# Patient Record
Sex: FEMALE | Race: WHITE | NOT HISPANIC OR LATINO | Employment: OTHER | URBAN - NONMETROPOLITAN AREA
[De-identification: names, ages, dates, MRNs, and addresses within clinical notes are randomized per-mention and may not be internally consistent; named-entity substitution may affect disease eponyms.]

---

## 2019-07-16 ENCOUNTER — HOSPITAL ENCOUNTER (EMERGENCY)
Facility: HOSPITAL | Age: 69
End: 2019-07-16

## 2020-03-02 ENCOUNTER — OFFICE VISIT (OUTPATIENT)
Dept: INTERNAL MEDICINE | Facility: CLINIC | Age: 70
End: 2020-03-02

## 2020-03-02 VITALS
HEIGHT: 63 IN | SYSTOLIC BLOOD PRESSURE: 150 MMHG | DIASTOLIC BLOOD PRESSURE: 94 MMHG | OXYGEN SATURATION: 97 % | WEIGHT: 220.13 LBS | RESPIRATION RATE: 18 BRPM | HEART RATE: 77 BPM | BODY MASS INDEX: 39 KG/M2

## 2020-03-02 DIAGNOSIS — Z00.00 ANNUAL PHYSICAL EXAM: Primary | ICD-10-CM

## 2020-03-02 DIAGNOSIS — E66.01 MORBIDLY OBESE (HCC): ICD-10-CM

## 2020-03-02 DIAGNOSIS — E78.49 OTHER HYPERLIPIDEMIA: ICD-10-CM

## 2020-03-02 DIAGNOSIS — I10 ESSENTIAL HYPERTENSION: ICD-10-CM

## 2020-03-02 DIAGNOSIS — Z23 NEED FOR VACCINATION AGAINST STREPTOCOCCUS PNEUMONIAE USING PNEUMOCOCCAL CONJUGATE VACCINE 13: ICD-10-CM

## 2020-03-02 PROBLEM — E66.9 OBESITY: Status: ACTIVE | Noted: 2018-07-23

## 2020-03-02 PROBLEM — Z91.81 AT RISK FOR FALLS: Status: ACTIVE | Noted: 2018-07-23

## 2020-03-02 PROCEDURE — 99214 OFFICE O/P EST MOD 30 MIN: CPT | Performed by: NURSE PRACTITIONER

## 2020-03-02 PROCEDURE — G0009 ADMIN PNEUMOCOCCAL VACCINE: HCPCS | Performed by: NURSE PRACTITIONER

## 2020-03-02 PROCEDURE — 90670 PCV13 VACCINE IM: CPT | Performed by: NURSE PRACTITIONER

## 2020-03-02 RX ORDER — ATORVASTATIN CALCIUM 20 MG/1
20 TABLET, FILM COATED ORAL DAILY
Qty: 90 TABLET | Refills: 3 | Status: SHIPPED | OUTPATIENT
Start: 2020-03-02 | End: 2020-03-16 | Stop reason: SDUPTHER

## 2020-03-02 RX ORDER — LISINOPRIL 20 MG/1
1 TABLET ORAL EVERY 12 HOURS SCHEDULED
COMMUNITY
End: 2020-03-02 | Stop reason: SDUPTHER

## 2020-03-02 RX ORDER — HYDROCHLOROTHIAZIDE 25 MG/1
25 TABLET ORAL DAILY
Qty: 90 TABLET | Refills: 3 | Status: SHIPPED | OUTPATIENT
Start: 2020-03-02 | End: 2020-03-16 | Stop reason: SDUPTHER

## 2020-03-02 RX ORDER — HYDROCHLOROTHIAZIDE 25 MG/1
1 TABLET ORAL DAILY
COMMUNITY
Start: 2019-12-06 | End: 2020-03-02 | Stop reason: SDUPTHER

## 2020-03-02 RX ORDER — ATORVASTATIN CALCIUM 20 MG/1
1 TABLET, FILM COATED ORAL DAILY
COMMUNITY
Start: 2020-01-18 | End: 2020-03-02 | Stop reason: SDUPTHER

## 2020-03-02 RX ORDER — LISINOPRIL 20 MG/1
20 TABLET ORAL DAILY
Qty: 90 TABLET | Refills: 3 | Status: SHIPPED | OUTPATIENT
Start: 2020-03-02 | End: 2020-03-16 | Stop reason: SDUPTHER

## 2020-03-02 NOTE — PROGRESS NOTES
Subjective   Serenity Hung is a 69 y.o. female.   Chief Complaint   Patient presents with   • Annual Exam     Yearly exam.  Needs new lab orders.       Serenity presents today for annual physical.  She denies complaints or concerns to discuss today.  She is due for labs and is fasting.  She reports already having Hep C screening with her old physician in [place] which yielded negative results.  She is currently scheduled for Dexa scan and a Mammogram.  She reports her Shingrix and Tdap vaccines are already done, though she is wanting to get the Prevnar vaccine today.         The following portions of the patient's history were reviewed and updated as appropriate: allergies, current medications, past family history, past medical history, past social history, past surgical history and problem list.    Review of Systems   Constitutional: Negative for activity change, appetite change, fatigue, fever, unexpected weight gain and unexpected weight loss.   HENT: Negative for swollen glands, trouble swallowing and voice change.    Eyes: Negative for blurred vision and visual disturbance.   Respiratory: Negative for cough and shortness of breath.    Cardiovascular: Negative for chest pain, palpitations and leg swelling.   Gastrointestinal: Negative for abdominal pain, constipation, diarrhea, nausea, vomiting and indigestion.   Endocrine: Negative for cold intolerance, heat intolerance, polydipsia and polyphagia.   Genitourinary: Negative for dysuria and frequency.   Musculoskeletal: Negative for arthralgias, back pain, joint swelling and neck pain.   Skin: Negative for color change, rash and skin lesions.   Neurological: Negative for dizziness, weakness, headache, memory problem and confusion.   Hematological: Does not bruise/bleed easily.   Psychiatric/Behavioral: Negative for agitation, hallucinations and suicidal ideas. The patient is not nervous/anxious.        Objective   Past Medical History:   Diagnosis Date   •  Hyperlipidemia    • Hypertension       History reviewed. No pertinent surgical history.     Current Outpatient Medications:   •  atorvastatin (LIPITOR) 20 MG tablet, Take 1 tablet by mouth Daily., Disp: 90 tablet, Rfl: 3  •  hydroCHLOROthiazide (HYDRODIURIL) 25 MG tablet, Take 1 tablet by mouth Daily., Disp: 90 tablet, Rfl: 3  •  lisinopril (PRINIVIL,ZESTRIL) 20 MG tablet, Take 1 tablet by mouth Daily., Disp: 90 tablet, Rfl: 3     Vitals:    03/02/20 0843   BP: 150/94   Pulse: 77   Resp: 18   SpO2: 97%         03/02/20  0843   Weight: 99.8 kg (220 lb 2 oz)     Patient's Body mass index is 38.99 kg/m². BMI is above normal parameters. Recommendations include: nutrition counseling.      Physical Exam   Constitutional: She is oriented to person, place, and time. She appears well-developed and well-nourished.   HENT:   Head: Normocephalic and atraumatic.   Right Ear: External ear normal.   Left Ear: External ear normal.   Nose: Nose normal.   Mouth/Throat: Oropharynx is clear and moist.   Eyes: Conjunctivae and EOM are normal.   Neck: Normal range of motion. Neck supple. No thyromegaly present.   Cardiovascular: Normal rate, regular rhythm, normal heart sounds and intact distal pulses.   Pulmonary/Chest: Effort normal and breath sounds normal.   Abdominal: Soft. Bowel sounds are normal.   Lymphadenopathy:     She has no cervical adenopathy.   Neurological: She is alert and oriented to person, place, and time.   Skin: Skin is warm and dry.   Several seborrheic keratosis lesions to back   Psychiatric: She has a normal mood and affect. Her behavior is normal. Thought content normal.   Nursing note and vitals reviewed.        Assessment/Plan   Diagnoses and all orders for this visit:    1. Annual physical exam (Primary)  -     Comprehensive Metabolic Panel  -     CBC & Differential  -     Lipid Panel  -     Uric Acid  -     TSH  -     Urinalysis With Microscopic - Urine, Clean Catch    2. Essential hypertension  -      hydroCHLOROthiazide (HYDRODIURIL) 25 MG tablet; Take 1 tablet by mouth Daily.  Dispense: 90 tablet; Refill: 3  -     lisinopril (PRINIVIL,ZESTRIL) 20 MG tablet; Take 1 tablet by mouth Daily.  Dispense: 90 tablet; Refill: 3    3. Other hyperlipidemia  -     atorvastatin (LIPITOR) 20 MG tablet; Take 1 tablet by mouth Daily.  Dispense: 90 tablet; Refill: 3    4. Morbidly obese (CMS/HCC)    5. Need for vaccination against Streptococcus pneumoniae using pneumococcal conjugate vaccine 13  -     Pneumococcal Conjugate Vaccine 13-Valent All (PCV13)    She refuses manual breast exam today but reports she checks herself monthly at home.   BP is elevated in office today.  Patient has not been monitor pressures at home.  Recheck by provider was 150/92, which is consistent to what MA got.  Advised to monitor her pressures at home over the next 2 weeks and log them.  Please return in 2 weeks with log to discuss.  Will adjust medication at that time if needed.   Prevnar vaccine today in office.

## 2020-03-03 LAB
ALBUMIN SERPL-MCNC: 4.3 G/DL (ref 3.5–5.2)
ALBUMIN/GLOB SERPL: 1.6 G/DL
ALP SERPL-CCNC: 65 U/L (ref 39–117)
ALT SERPL-CCNC: 14 U/L (ref 1–33)
APPEARANCE UR: CLEAR
AST SERPL-CCNC: 13 U/L (ref 1–32)
BACTERIA #/AREA URNS HPF: NORMAL /HPF
BASOPHILS # BLD AUTO: 0.05 10*3/MM3 (ref 0–0.2)
BASOPHILS NFR BLD AUTO: 0.9 % (ref 0–1.5)
BILIRUB SERPL-MCNC: 1.4 MG/DL (ref 0.2–1.2)
BILIRUB UR QL STRIP: NEGATIVE
BUN SERPL-MCNC: 10 MG/DL (ref 8–23)
BUN/CREAT SERPL: 13.7 (ref 7–25)
CALCIUM SERPL-MCNC: 9.1 MG/DL (ref 8.6–10.5)
CASTS URNS MICRO: NORMAL
CHLORIDE SERPL-SCNC: 93 MMOL/L (ref 98–107)
CHOLEST SERPL-MCNC: 113 MG/DL (ref 0–200)
CO2 SERPL-SCNC: 26.8 MMOL/L (ref 22–29)
COLOR UR: YELLOW
CREAT SERPL-MCNC: 0.73 MG/DL (ref 0.57–1)
EOSINOPHIL # BLD AUTO: 0.08 10*3/MM3 (ref 0–0.4)
EOSINOPHIL NFR BLD AUTO: 1.4 % (ref 0.3–6.2)
EPI CELLS #/AREA URNS HPF: NORMAL /HPF
ERYTHROCYTE [DISTWIDTH] IN BLOOD BY AUTOMATED COUNT: 13.9 % (ref 12.3–15.4)
GLOBULIN SER CALC-MCNC: 2.7 GM/DL
GLUCOSE SERPL-MCNC: 98 MG/DL (ref 65–99)
GLUCOSE UR QL: NEGATIVE
HCT VFR BLD AUTO: 37.8 % (ref 34–46.6)
HDLC SERPL-MCNC: 48 MG/DL (ref 40–60)
HGB BLD-MCNC: 12.3 G/DL (ref 12–15.9)
HGB UR QL STRIP: NEGATIVE
IMM GRANULOCYTES # BLD AUTO: 0.02 10*3/MM3 (ref 0–0.05)
IMM GRANULOCYTES NFR BLD AUTO: 0.4 % (ref 0–0.5)
KETONES UR QL STRIP: NEGATIVE
LDLC SERPL CALC-MCNC: 42 MG/DL (ref 0–100)
LEUKOCYTE ESTERASE UR QL STRIP: ABNORMAL
LYMPHOCYTES # BLD AUTO: 2.04 10*3/MM3 (ref 0.7–3.1)
LYMPHOCYTES NFR BLD AUTO: 36 % (ref 19.6–45.3)
MCH RBC QN AUTO: 25.3 PG (ref 26.6–33)
MCHC RBC AUTO-ENTMCNC: 32.5 G/DL (ref 31.5–35.7)
MCV RBC AUTO: 77.6 FL (ref 79–97)
MONOCYTES # BLD AUTO: 0.32 10*3/MM3 (ref 0.1–0.9)
MONOCYTES NFR BLD AUTO: 5.6 % (ref 5–12)
NEUTROPHILS # BLD AUTO: 3.16 10*3/MM3 (ref 1.7–7)
NEUTROPHILS NFR BLD AUTO: 55.7 % (ref 42.7–76)
NITRITE UR QL STRIP: NEGATIVE
NRBC BLD AUTO-RTO: 0 /100 WBC (ref 0–0.2)
PH UR STRIP: 6 [PH] (ref 5–8)
PLATELET # BLD AUTO: 324 10*3/MM3 (ref 140–450)
POTASSIUM SERPL-SCNC: 4.6 MMOL/L (ref 3.5–5.2)
PROT SERPL-MCNC: 7 G/DL (ref 6–8.5)
PROT UR QL STRIP: NEGATIVE
RBC # BLD AUTO: 4.87 10*6/MM3 (ref 3.77–5.28)
RBC #/AREA URNS HPF: NORMAL /HPF
SODIUM SERPL-SCNC: 132 MMOL/L (ref 136–145)
SP GR UR: 1.01 (ref 1–1.03)
TRIGL SERPL-MCNC: 114 MG/DL (ref 0–150)
TSH SERPL DL<=0.005 MIU/L-ACNC: 1.51 UIU/ML (ref 0.27–4.2)
URATE SERPL-MCNC: 5.5 MG/DL (ref 2.4–5.7)
UROBILINOGEN UR STRIP-MCNC: ABNORMAL MG/DL
VLDLC SERPL CALC-MCNC: 22.8 MG/DL
WBC # BLD AUTO: 5.67 10*3/MM3 (ref 3.4–10.8)
WBC #/AREA URNS HPF: NORMAL /HPF

## 2020-03-03 NOTE — PROGRESS NOTES
Labs look ok.  Sodium level is a little lower than the last time it was checked.  This could be related to the diuretic use.  She can add a small amount of salt to her food but needs to be careful not to overdue it as it will raise her BP.    Urinalysis shows trace leukocytes which is likely contamination.  Please check and see if she is having any urinary symptoms.

## 2020-03-16 ENCOUNTER — OFFICE VISIT (OUTPATIENT)
Dept: INTERNAL MEDICINE | Facility: CLINIC | Age: 70
End: 2020-03-16

## 2020-03-16 VITALS
RESPIRATION RATE: 18 BRPM | BODY MASS INDEX: 39.16 KG/M2 | DIASTOLIC BLOOD PRESSURE: 90 MMHG | WEIGHT: 221 LBS | OXYGEN SATURATION: 98 % | TEMPERATURE: 98.6 F | HEART RATE: 76 BPM | HEIGHT: 63 IN | SYSTOLIC BLOOD PRESSURE: 152 MMHG

## 2020-03-16 DIAGNOSIS — I10 ESSENTIAL HYPERTENSION: Primary | ICD-10-CM

## 2020-03-16 PROBLEM — M81.0 OSTEOPOROSIS, POSTMENOPAUSAL: Status: ACTIVE | Noted: 2020-03-16

## 2020-03-16 PROCEDURE — 99213 OFFICE O/P EST LOW 20 MIN: CPT | Performed by: NURSE PRACTITIONER

## 2020-03-16 RX ORDER — HYDROCHLOROTHIAZIDE 25 MG/1
25 TABLET ORAL DAILY
COMMUNITY
Start: 2019-08-26 | End: 2020-08-25 | Stop reason: SDUPTHER

## 2020-03-16 RX ORDER — AMLODIPINE BESYLATE 5 MG/1
2.5 TABLET ORAL DAILY
Qty: 15 TABLET | Refills: 5 | Status: SHIPPED | OUTPATIENT
Start: 2020-03-16 | End: 2020-04-14 | Stop reason: SDUPTHER

## 2020-03-16 RX ORDER — LISINOPRIL 20 MG/1
20 TABLET ORAL DAILY
COMMUNITY
Start: 2019-08-26 | End: 2020-12-30

## 2020-03-16 RX ORDER — ATORVASTATIN CALCIUM 20 MG/1
20 TABLET, FILM COATED ORAL DAILY
COMMUNITY
Start: 2019-08-26 | End: 2020-12-30

## 2020-03-16 NOTE — PATIENT INSTRUCTIONS
Monitor Blood pressure.   Bring blood pressure cuff with you to next appointment or to the pharmacy to have the accuracy checked.

## 2020-03-16 NOTE — PROGRESS NOTES
Subjective   Serenity Hung is a 69 y.o. female.   Chief Complaint   Patient presents with   • Follow-up     hypertension       Abhilash presents for a 2 week follow up on Hypertension.  She was elevated at that past visit and was instructed to monitor her pressures at home and would re-evaluate today.  She does not have a reading list with her today and states she doesn't trust her cuff at home.  She reports she checked her pressure this morning at it was about 130s/80s.  Again, she states she doesn't trust what her cuff tells her as it is always lower than what it obtained in the office.         The following portions of the patient's history were reviewed and updated as appropriate: allergies, current medications, past family history, past medical history, past social history, past surgical history and problem list.    Review of Systems   Constitutional: Negative for activity change, appetite change, fatigue, fever, unexpected weight gain and unexpected weight loss.   HENT: Negative for swollen glands, trouble swallowing and voice change.    Eyes: Negative for blurred vision and visual disturbance.   Respiratory: Negative for cough and shortness of breath.    Cardiovascular: Negative for chest pain, palpitations and leg swelling.   Gastrointestinal: Negative for abdominal pain, constipation, diarrhea, nausea, vomiting and indigestion.   Endocrine: Negative for cold intolerance, heat intolerance, polydipsia and polyphagia.   Genitourinary: Negative for dysuria and frequency.   Musculoskeletal: Negative for arthralgias, back pain, joint swelling and neck pain.   Skin: Negative for color change, rash and skin lesions.   Neurological: Negative for dizziness, weakness, headache, memory problem and confusion.   Hematological: Does not bruise/bleed easily.   Psychiatric/Behavioral: Negative for agitation, hallucinations and suicidal ideas. The patient is not nervous/anxious.        Objective   Past Medical History:   Diagnosis  Date   • Hyperlipidemia    • Hypertension       History reviewed. No pertinent surgical history.     Current Outpatient Medications:   •  atorvastatin (LIPITOR) 20 MG tablet, Take 20 mg by mouth Daily., Disp: , Rfl:   •  hydroCHLOROthiazide (HYDRODIURIL) 25 MG tablet, Take 25 mg by mouth Daily., Disp: , Rfl:   •  lisinopril (PRINIVIL,ZESTRIL) 20 MG tablet, Take 20 mg by mouth Daily., Disp: , Rfl:   •  amLODIPine (NORVASC) 5 MG tablet, Take 0.5 tablets by mouth Daily., Disp: 15 tablet, Rfl: 5     Vitals:    03/16/20 0906   BP: 152/90   Pulse: 76   Resp: 18   Temp: 98.6 °F (37 °C)   SpO2: 98%         03/16/20 0906   Weight: 100 kg (221 lb)         Physical Exam   Constitutional: She is oriented to person, place, and time. She appears well-developed and well-nourished.   HENT:   Head: Normocephalic and atraumatic.   Right Ear: External ear normal.   Left Ear: External ear normal.   Nose: Nose normal.   Eyes: Conjunctivae and EOM are normal.   Cardiovascular: Normal rate, regular rhythm and normal heart sounds.   1+ bilateral lower extremity edema   Pulmonary/Chest: Effort normal and breath sounds normal.   Neurological: She is alert and oriented to person, place, and time.   Skin: Skin is warm and dry.   Psychiatric: She has a normal mood and affect. Her behavior is normal. Thought content normal.   Nursing note and vitals reviewed.          Assessment/Plan   Diagnoses and all orders for this visit:    1. Essential hypertension (Primary)  -     amLODIPine (NORVASC) 5 MG tablet; Take 0.5 tablets by mouth Daily.  Dispense: 15 tablet; Refill: 5      BP elevated in office again today.  Recheck on BP by this provider was 148/82, after patient had been sitting minimum of 15 minutes.  Will start low dose of amlodipine today.  I have encouraged patient to bring cuff to pharmacy or clinic to have the accuracy checked.  Since there is some question about how patient really runs at home, will start lower dose and see how she  tolerates.  She will follow up in 4 weeks to reassess.

## 2020-04-08 ENCOUNTER — TELEPHONE (OUTPATIENT)
Dept: INTERNAL MEDICINE | Facility: CLINIC | Age: 70
End: 2020-04-08

## 2020-04-08 NOTE — TELEPHONE ENCOUNTER
Called patient to discuss changing appointment to a telephone visit.  She states she is doing just fine.  She reports readings are 122/67, 126/73, 137/72.  She denies side effects of swelling related to amlodipine.  She denies chest pain, palpitations, or shortness of breath.  Will cancel appointment at this time as patient does not have any concerns and pressures are doing well.  She plans to come to the office to have BP checked manually when pandemic has settled.

## 2020-04-14 DIAGNOSIS — I10 ESSENTIAL HYPERTENSION: ICD-10-CM

## 2020-04-14 RX ORDER — AMLODIPINE BESYLATE 5 MG/1
2.5 TABLET ORAL DAILY
Qty: 45 TABLET | Refills: 3 | Status: SHIPPED | OUTPATIENT
Start: 2020-04-14 | End: 2020-06-12 | Stop reason: SDUPTHER

## 2020-06-12 DIAGNOSIS — I10 ESSENTIAL HYPERTENSION: ICD-10-CM

## 2020-06-12 RX ORDER — AMLODIPINE BESYLATE 5 MG/1
2.5 TABLET ORAL DAILY
Qty: 45 TABLET | Refills: 3 | Status: SHIPPED | OUTPATIENT
Start: 2020-06-12 | End: 2021-02-23 | Stop reason: SDUPTHER

## 2020-06-15 ENCOUNTER — OFFICE VISIT (OUTPATIENT)
Dept: INTERNAL MEDICINE | Facility: CLINIC | Age: 70
End: 2020-06-15

## 2020-06-15 VITALS
HEART RATE: 73 BPM | HEIGHT: 63 IN | OXYGEN SATURATION: 99 % | BODY MASS INDEX: 38.06 KG/M2 | TEMPERATURE: 98.4 F | DIASTOLIC BLOOD PRESSURE: 86 MMHG | SYSTOLIC BLOOD PRESSURE: 120 MMHG | WEIGHT: 214.8 LBS

## 2020-06-15 DIAGNOSIS — E78.49 OTHER HYPERLIPIDEMIA: ICD-10-CM

## 2020-06-15 DIAGNOSIS — I10 ESSENTIAL HYPERTENSION: Primary | ICD-10-CM

## 2020-06-15 PROCEDURE — 99213 OFFICE O/P EST LOW 20 MIN: CPT | Performed by: NURSE PRACTITIONER

## 2020-06-15 NOTE — PROGRESS NOTES
Subjective   Serenity Hung is a 69 y.o. female.   Chief Complaint   Patient presents with   • Hypertension     follow-up   • Medication Problem     discuss lisinopril dosage       Serenity presents today for 3 month follow up on Hypertension.  At her previous visit she was started on Amlodipine which she has been tolerating well.  Since that visit she has reported via telephone 120-130/67-70s readings.  Today she is 120/86.  She denies any chest pain, shortness of breath, palpitations, or worsening leg edema.  She states she is confused on her Lisinopril stating she used to take it twice a day but when she got her new bottle it says once daily.  She believes she has 10mg tablets, not 20mg.         The following portions of the patient's history were reviewed and updated as appropriate: allergies, current medications, past family history, past medical history, past social history, past surgical history and problem list.    Review of Systems   Constitutional: Negative for activity change, appetite change, fatigue, fever, unexpected weight gain and unexpected weight loss.   HENT: Negative for swollen glands, trouble swallowing and voice change.    Eyes: Negative for blurred vision and visual disturbance.   Respiratory: Negative for cough and shortness of breath.    Cardiovascular: Negative for chest pain, palpitations and leg swelling.   Gastrointestinal: Negative for abdominal pain, constipation, diarrhea, nausea, vomiting and indigestion.   Endocrine: Negative for cold intolerance, heat intolerance, polydipsia and polyphagia.   Genitourinary: Negative for dysuria and frequency.   Musculoskeletal: Negative for arthralgias, back pain, joint swelling and neck pain.   Skin: Negative for color change, rash and skin lesions.   Neurological: Negative for dizziness, weakness, headache, memory problem and confusion.   Hematological: Does not bruise/bleed easily.   Psychiatric/Behavioral: Negative for agitation, hallucinations and  suicidal ideas. The patient is not nervous/anxious.        Objective   Past Medical History:   Diagnosis Date   • Hyperlipidemia    • Hypertension       History reviewed. No pertinent surgical history.     Current Outpatient Medications:   •  amLODIPine (NORVASC) 5 MG tablet, Take 0.5 tablets by mouth Daily., Disp: 45 tablet, Rfl: 3  •  atorvastatin (LIPITOR) 20 MG tablet, Take 20 mg by mouth Daily., Disp: , Rfl:   •  hydroCHLOROthiazide (HYDRODIURIL) 25 MG tablet, Take 25 mg by mouth Daily., Disp: , Rfl:   •  lisinopril (PRINIVIL,ZESTRIL) 20 MG tablet, Take 20 mg by mouth Daily., Disp: , Rfl:      Vitals:    06/15/20 0940   BP: 120/86   Pulse: 73   Temp: 98.4 °F (36.9 °C)   SpO2: 99%         06/15/20  0940   Weight: 97.4 kg (214 lb 12.8 oz)     Patient's Body mass index is 38.05 kg/m². BMI is above normal parameters. Recommendations include: nutrition counseling.      Physical Exam   Constitutional: She is oriented to person, place, and time. She appears well-developed and well-nourished.   HENT:   Head: Normocephalic and atraumatic.   Right Ear: External ear normal.   Left Ear: External ear normal.   Nose: Nose normal.   Mouth/Throat: Oropharynx is clear and moist.   Eyes: Conjunctivae and EOM are normal.   Neck: Normal range of motion. Neck supple.   Cardiovascular: Normal rate, regular rhythm, normal heart sounds and intact distal pulses.   Pulses:       Radial pulses are 2+ on the right side, and 2+ on the left side.        Dorsalis pedis pulses are 2+ on the right side, and 2+ on the left side.   No bilateral lower extremity edema    Pulmonary/Chest: Effort normal and breath sounds normal.   Lymphadenopathy:     She has no cervical adenopathy.   Neurological: She is alert and oriented to person, place, and time.   Skin: Skin is warm and dry.   Psychiatric: She has a normal mood and affect. Her speech is normal and behavior is normal. Thought content normal.   Nursing note and vitals  reviewed.            Assessment/Plan   Diagnoses and all orders for this visit:    1. Essential hypertension (Primary)    2. Other hyperlipidemia      Blood pressure is well managed at this time.  She presents with readings in office that shoe 120-130s/70-80s.  Due to confusion of Lisinopril dosing, I have requested she check her bottle when she gets home and reports exactly what she has and how she is taking it.  I will update the chart when I receive the information.  I have asked that she not make any changes from what she is doing as her BP is well controlled and she is feeling good.    Will follow up in 3 months for her 6 month follow up.  We will complete her Medicare wellness at that time as well.  Continue to work on diet and watch sodium intake.    She is scheduled for her Mammogram next month and declines DEXA scan this year.

## 2020-07-13 ENCOUNTER — TELEPHONE (OUTPATIENT)
Dept: INTERNAL MEDICINE | Facility: CLINIC | Age: 70
End: 2020-07-13

## 2020-07-13 DIAGNOSIS — M81.0 OSTEOPOROSIS, POSTMENOPAUSAL: Primary | ICD-10-CM

## 2020-07-28 ENCOUNTER — TELEPHONE (OUTPATIENT)
Dept: INTERNAL MEDICINE | Facility: CLINIC | Age: 70
End: 2020-07-28

## 2020-07-28 DIAGNOSIS — M85.80 OSTEOPENIA, UNSPECIFIED LOCATION: Primary | ICD-10-CM

## 2020-07-28 RX ORDER — ALENDRONATE SODIUM 70 MG/1
70 TABLET ORAL
Qty: 12 TABLET | Refills: 3 | Status: SHIPPED | OUTPATIENT
Start: 2020-07-28 | End: 2021-03-18

## 2020-07-28 NOTE — TELEPHONE ENCOUNTER
Ok.  I would still recommend Vitamin D and Calcium supplements.  Can you please contact her pharmacy and cancel the Fosamax Rx.

## 2020-07-28 NOTE — TELEPHONE ENCOUNTER
"Notified patient in regards to dexa scan results. Patient informed that she will need to start Calcium/Vit D supplement and Fosamax.    Per patient she will not take Fosamax due to reading information about medication. States last year T-score was -2.5 and this time -2.4. \"Shows that it is stable and is not to concern about it. She will start taking Calcium and Vitamin D as suggested.  "

## 2020-07-28 NOTE — TELEPHONE ENCOUNTER
Called mail order pharmacy to cancel fosamax prescription. Spoke to Sarah LEE who canceled this prescription today at 1108 a.m.

## 2020-08-25 RX ORDER — HYDROCHLOROTHIAZIDE 25 MG/1
25 TABLET ORAL DAILY
Qty: 90 TABLET | Refills: 3 | Status: SHIPPED | OUTPATIENT
Start: 2020-08-25 | End: 2020-09-14 | Stop reason: SDUPTHER

## 2020-09-10 ENCOUNTER — TELEPHONE (OUTPATIENT)
Dept: INTERNAL MEDICINE | Facility: CLINIC | Age: 70
End: 2020-09-10

## 2020-09-14 RX ORDER — HYDROCHLOROTHIAZIDE 25 MG/1
25 TABLET ORAL DAILY
Qty: 90 TABLET | Refills: 3 | Status: SHIPPED | OUTPATIENT
Start: 2020-09-14 | End: 2021-02-23 | Stop reason: SDUPTHER

## 2020-09-18 ENCOUNTER — OFFICE VISIT (OUTPATIENT)
Dept: INTERNAL MEDICINE | Facility: CLINIC | Age: 70
End: 2020-09-18

## 2020-09-18 VITALS
WEIGHT: 217 LBS | DIASTOLIC BLOOD PRESSURE: 86 MMHG | OXYGEN SATURATION: 99 % | HEART RATE: 69 BPM | TEMPERATURE: 97.5 F | HEIGHT: 63 IN | BODY MASS INDEX: 38.45 KG/M2 | SYSTOLIC BLOOD PRESSURE: 130 MMHG

## 2020-09-18 DIAGNOSIS — Z00.00 INITIAL MEDICARE ANNUAL WELLNESS VISIT: Primary | ICD-10-CM

## 2020-09-18 DIAGNOSIS — M81.0 OSTEOPOROSIS, POSTMENOPAUSAL: ICD-10-CM

## 2020-09-18 DIAGNOSIS — I10 ESSENTIAL HYPERTENSION: ICD-10-CM

## 2020-09-18 DIAGNOSIS — E78.49 OTHER HYPERLIPIDEMIA: ICD-10-CM

## 2020-09-18 PROCEDURE — G0438 PPPS, INITIAL VISIT: HCPCS | Performed by: NURSE PRACTITIONER

## 2020-09-18 NOTE — PROGRESS NOTES
The ABCs of the Annual Wellness Visit  Initial Medicare Wellness Visit    Chief Complaint   Patient presents with   • Medicare Wellness-Initial Visit       Subjective   History of Present Illness:  Serenity Hung is a 70 y.o. female who presents for an Initial Medicare Wellness Visit.  She has a history of hypertension and elevated lipids .  Her annual physical with this office was in 3/30 and full blood work was obtained at that time.  She is UTD on her Mammogram, Dexa scan, and Colonoscopy.      HEALTH RISK ASSESSMENT    Recent Hospitalizations:  No hospitalization(s) within the last year.    Current Medical Providers:  Patient Care Team:  Venice Ordoñez APRN as PCP - General (Family Medicine)    Smoking Status:  Social History     Tobacco Use   Smoking Status Never Smoker   Smokeless Tobacco Never Used       Alcohol Consumption:  Social History     Substance and Sexual Activity   Alcohol Use Never   • Frequency: Never       Depression Screen:   PHQ-2/PHQ-9 Depression Screening 9/18/2020   Little interest or pleasure in doing things 0   Feeling down, depressed, or hopeless 0   Total Score 0       Fall Risk Screen:  ELIZABETH Fall Risk Assessment was completed, and patient is at LOW risk for falls.Assessment completed on:9/18/2020    Health Habits and Functional and Cognitive Screening:  Functional & Cognitive Status 9/18/2020   Do you have difficulty preparing food and eating? No   Do you have difficulty bathing yourself, getting dressed or grooming yourself? No   Do you have difficulty using the toilet? No   Do you have difficulty moving around from place to place? No   Do you have trouble with steps or getting out of a bed or a chair? No   Current Diet Well Balanced Diet   Dental Exam Up to date   Eye Exam Up to date   Exercise (times per week) 7 times per week   Current Exercise Activities Include Walking   Do you need help using the phone?  No   Are you deaf or do you have serious difficulty hearing?  No   Do you  need help with transportation? No   Do you need help shopping? No   Do you need help preparing meals?  No   Do you need help with housework?  No   Do you need help with laundry? No   Do you need help taking your medications? No   Do you need help managing money? No   Do you ever drive or ride in a car without wearing a seat belt? No   Have you felt unusual stress, anger or loneliness in the last month? No   Who do you live with? Other   If you need help, do you have trouble finding someone available to you? No         Does the patient have evidence of cognitive impairment? No    Asprin use counseling:Does not need ASA (and currently is not on it)    Age-appropriate Screening Schedule:  Refer to the list below for future screening recommendations based on patient's age, sex and/or medical conditions. Orders for these recommended tests are listed in the plan section. The patient has been provided with a written plan.    Health Maintenance   Topic Date Due   • ZOSTER VACCINE (1 of 2) 07/22/1998   • INFLUENZA VACCINE  08/01/2020   • TDAP/TD VACCINES (1 - Tdap) 03/02/2021 (Originally 7/22/1969)   • LIPID PANEL  03/02/2021   • MAMMOGRAM  07/15/2022   • DXA SCAN  07/31/2022   • COLONOSCOPY  11/30/2025          The following portions of the patient's history were reviewed and updated as appropriate: allergies, current medications, past family history, past medical history, past social history, past surgical history and problem list.    Outpatient Medications Prior to Visit   Medication Sig Dispense Refill   • amLODIPine (NORVASC) 5 MG tablet Take 0.5 tablets by mouth Daily. 45 tablet 3   • atorvastatin (LIPITOR) 20 MG tablet Take 20 mg by mouth Daily.     • hydroCHLOROthiazide (HYDRODIURIL) 25 MG tablet Take 1 tablet by mouth Daily. 90 tablet 3   • lisinopril (PRINIVIL,ZESTRIL) 20 MG tablet Take 20 mg by mouth Daily.     • alendronate (Fosamax) 70 MG tablet Take 1 tablet by mouth Every 7 (Seven) Days. 12 tablet 3     No  "facility-administered medications prior to visit.        Patient Active Problem List   Diagnosis   • Hypertensive disorder   • Morbidly obese (CMS/HCC)   • At risk for falls   • Other hyperlipidemia   • Osteoporosis, postmenopausal       Advanced Care Planning:  ACP discussion was held with the patient during this visit. Patient does not have an advance directive, information provided.    Review of Systems   Constitutional: Negative for fever.   HENT: Negative for trouble swallowing.    Eyes: Negative for visual disturbance.   Respiratory: Negative for shortness of breath.    Cardiovascular: Negative for chest pain and palpitations.   Gastrointestinal: Negative for abdominal pain, nausea and vomiting.   Genitourinary: Negative for dysuria.   Musculoskeletal: Negative for back pain.   Skin: Negative for wound.   Neurological: Negative for headaches.   Psychiatric/Behavioral: The patient is not nervous/anxious.        Compared to one year ago, the patient feels her physical health is the same.  Compared to one year ago, the patient feels her mental health is the same.    Reviewed chart for potential of high risk medication in the elderly: yes  Reviewed chart for potential of harmful drug interactions in the elderly:yes    Objective         Vitals:    09/18/20 0812   BP: 130/86   BP Location: Left arm   Patient Position: Sitting   Cuff Size: Adult   Pulse: 69   Temp: 97.5 °F (36.4 °C)   TempSrc: Infrared   SpO2: 99%   Weight: 98.4 kg (217 lb)   Height: 160 cm (63\")   PainSc: 0-No pain       Body mass index is 38.44 kg/m².  Discussed the patient's BMI with her. The BMI is above average; BMI management plan is completed.    Physical Exam  Vitals signs and nursing note reviewed.   Constitutional:       Appearance: She is well-developed.   HENT:      Head: Normocephalic and atraumatic.      Right Ear: Tympanic membrane and external ear normal.      Left Ear: Tympanic membrane and external ear normal.      Nose: Nose " normal.      Mouth/Throat:      Mouth: Mucous membranes are moist. No oral lesions.      Pharynx: Oropharynx is clear.   Eyes:      Conjunctiva/sclera: Conjunctivae normal.      Pupils: Pupils are equal, round, and reactive to light.   Neck:      Musculoskeletal: Normal range of motion and neck supple.      Thyroid: No thyromegaly.      Vascular: No carotid bruit.   Cardiovascular:      Rate and Rhythm: Normal rate and regular rhythm.      Pulses:           Radial pulses are 2+ on the right side and 2+ on the left side.      Heart sounds: Normal heart sounds.   Pulmonary:      Effort: Pulmonary effort is normal.      Breath sounds: Normal breath sounds.   Abdominal:      General: Bowel sounds are normal.      Palpations: Abdomen is soft.      Tenderness: There is no abdominal tenderness.   Musculoskeletal:      Right lower leg: No edema.      Left lower leg: No edema.   Lymphadenopathy:      Cervical: No cervical adenopathy.   Skin:     General: Skin is warm and dry.   Neurological:      Mental Status: She is alert and oriented to person, place, and time.      Gait: Gait normal.   Psychiatric:         Mood and Affect: Mood normal.         Speech: Speech normal.         Behavior: Behavior normal.         Thought Content: Thought content normal.               Assessment/Plan   Medicare Risks and Personalized Health Plan  CMS Preventative Services Quick Reference  Advance Directive Discussion  Breast Cancer/Mammogram Screening  Cardiovascular risk  Chronic Pain   Colon Cancer Screening  Dementia/Memory   Depression/Dysphoria  Diabetic Lab Screening   Fall Risk  Hearing Problem  Immunizations Discussed/Encouraged (specific immunizations; Influenza and Shingrix )  Inadequate Social Support, Isolation, Loneliness, Lack of Transportation, Financial Difficulties, or Caregiver Stress   Inactivity/Sedentary  Obesity/Overweight   Osteoprorosis Risk  Tobacco Use/Dependance (use dotphrase .tobaccocessation for  documentation)  Urinary Incontinence    The above risks/problems have been discussed with the patient.  Pertinent information has been shared with the patient in the After Visit Summary.Follow up plans and orders are seen below in the Assessment/Plan Section.    Diagnoses and all orders for this visit:    1. Initial Medicare annual wellness visit (Primary)    2. Essential hypertension  -     Basic Metabolic Panel    3. Other hyperlipidemia    4. Osteoporosis, postmenopausal      Follow Up:  Return in about 6 months (around 3/18/2021) for Annual physical.     An After Visit Summary and PPPS were given to the patient.     Continue on Vitamin D for Osteoporosis.  Attempt to take Calcium if possible.  Patient reports this makes her nauseated.    Will obtain BMP today as patient is on diuretic therapy.    She has already had her flu vaccine.  We did discuss Shingrix during this visit.  Patient to check with insurance on coverage if she is interested.

## 2020-09-19 LAB
BUN SERPL-MCNC: 11 MG/DL (ref 8–23)
BUN/CREAT SERPL: 14.3 (ref 7–25)
CALCIUM SERPL-MCNC: 9.2 MG/DL (ref 8.6–10.5)
CHLORIDE SERPL-SCNC: 94 MMOL/L (ref 98–107)
CO2 SERPL-SCNC: 30.3 MMOL/L (ref 22–29)
CREAT SERPL-MCNC: 0.77 MG/DL (ref 0.57–1)
GLUCOSE SERPL-MCNC: 87 MG/DL (ref 65–99)
POTASSIUM SERPL-SCNC: 4.7 MMOL/L (ref 3.5–5.2)
SODIUM SERPL-SCNC: 135 MMOL/L (ref 136–145)

## 2020-12-30 RX ORDER — ATORVASTATIN CALCIUM 20 MG/1
TABLET, FILM COATED ORAL
Qty: 90 TABLET | Refills: 3 | Status: SHIPPED | OUTPATIENT
Start: 2020-12-30 | End: 2021-12-29

## 2020-12-30 RX ORDER — LISINOPRIL 20 MG/1
TABLET ORAL
Qty: 90 TABLET | Refills: 3 | Status: SHIPPED | OUTPATIENT
Start: 2020-12-30 | End: 2022-01-14

## 2021-02-23 ENCOUNTER — IMMUNIZATION (OUTPATIENT)
Dept: VACCINE CLINIC | Facility: HOSPITAL | Age: 71
End: 2021-02-23

## 2021-02-23 DIAGNOSIS — I10 ESSENTIAL HYPERTENSION: ICD-10-CM

## 2021-02-23 PROCEDURE — 0011A: CPT | Performed by: OBSTETRICS & GYNECOLOGY

## 2021-02-23 PROCEDURE — 91301 HC SARSCO02 VAC 100MCG/0.5ML IM: CPT | Performed by: OBSTETRICS & GYNECOLOGY

## 2021-02-23 RX ORDER — HYDROCHLOROTHIAZIDE 25 MG/1
25 TABLET ORAL DAILY
Qty: 90 TABLET | Refills: 3 | Status: SHIPPED | OUTPATIENT
Start: 2021-02-23 | End: 2021-11-23

## 2021-02-23 RX ORDER — AMLODIPINE BESYLATE 5 MG/1
2.5 TABLET ORAL DAILY
Qty: 45 TABLET | Refills: 3 | Status: SHIPPED | OUTPATIENT
Start: 2021-02-23 | End: 2021-11-23

## 2021-03-18 ENCOUNTER — OFFICE VISIT (OUTPATIENT)
Dept: INTERNAL MEDICINE | Facility: CLINIC | Age: 71
End: 2021-03-18

## 2021-03-18 VITALS
SYSTOLIC BLOOD PRESSURE: 134 MMHG | WEIGHT: 215.2 LBS | TEMPERATURE: 97.5 F | DIASTOLIC BLOOD PRESSURE: 76 MMHG | BODY MASS INDEX: 38.13 KG/M2 | HEIGHT: 63 IN | OXYGEN SATURATION: 99 % | HEART RATE: 71 BPM

## 2021-03-18 DIAGNOSIS — I10 ESSENTIAL HYPERTENSION: ICD-10-CM

## 2021-03-18 DIAGNOSIS — Z12.31 BREAST CANCER SCREENING BY MAMMOGRAM: ICD-10-CM

## 2021-03-18 DIAGNOSIS — E66.01 MORBIDLY OBESE (HCC): ICD-10-CM

## 2021-03-18 DIAGNOSIS — Z00.00 ANNUAL PHYSICAL EXAM: Primary | ICD-10-CM

## 2021-03-18 DIAGNOSIS — E78.49 OTHER HYPERLIPIDEMIA: ICD-10-CM

## 2021-03-18 DIAGNOSIS — M81.0 OSTEOPOROSIS, POSTMENOPAUSAL: ICD-10-CM

## 2021-03-18 PROCEDURE — 99397 PER PM REEVAL EST PAT 65+ YR: CPT | Performed by: NURSE PRACTITIONER

## 2021-03-18 NOTE — PROGRESS NOTES
Subjective   Serenity Hung is a 70 y.o. female.   Chief Complaint   Patient presents with   • Medicare Wellness-subsequent       Serenity presents today for her annual physical.  She has a history of hypertension, Hyperlipidemia, and osteoporosis.  She does monitor her BP at home and reports readings around 130s/70s.  She denies any chest pain, palpitations, or shortness of breath.  She has a history of osteoporosis and currently takes Vitamin D supplement.  She is UTD on her DEXA scans.  She has declined Fosamax due to information she has read on the medication. Her last mammogram was in July 2020.  Her last colonoscopy was in 11/30/2015.  She states it was normal and does not want to update this at this time.  She has completed cologuard in the past as well.   She states she has had both pneumonia vaccine in the past. She is scheduled for her second Covid-19 vaccine next week.         The following portions of the patient's history were reviewed and updated as appropriate: allergies, current medications, past family history, past medical history, past social history, past surgical history and problem list.    Review of Systems   Constitutional: Negative for activity change, appetite change, fatigue, fever, unexpected weight gain and unexpected weight loss.   HENT: Negative for swollen glands, trouble swallowing and voice change.    Eyes: Negative for blurred vision and visual disturbance.   Respiratory: Negative for cough and shortness of breath.    Cardiovascular: Negative for chest pain, palpitations and leg swelling.   Gastrointestinal: Negative for abdominal pain, constipation, diarrhea, nausea, vomiting and indigestion.   Endocrine: Negative for cold intolerance, heat intolerance, polydipsia and polyphagia.   Genitourinary: Negative for dysuria and frequency.   Musculoskeletal: Negative for arthralgias, back pain, joint swelling and neck pain.   Skin: Negative for color change, rash and skin lesions.    Neurological: Negative for dizziness, weakness, headache, memory problem and confusion.   Hematological: Does not bruise/bleed easily.   Psychiatric/Behavioral: Negative for agitation, hallucinations and suicidal ideas. The patient is not nervous/anxious.        Objective   Past Medical History:   Diagnosis Date   • Hyperlipidemia    • Hypertension       History reviewed. No pertinent surgical history.     Current Outpatient Medications:   •  amLODIPine (NORVASC) 5 MG tablet, Take 0.5 tablets by mouth Daily., Disp: 45 tablet, Rfl: 3  •  atorvastatin (LIPITOR) 20 MG tablet, TAKE 1 TABLET BY MOUTH  DAILY, Disp: 90 tablet, Rfl: 3  •  hydroCHLOROthiazide (HYDRODIURIL) 25 MG tablet, Take 1 tablet by mouth Daily., Disp: 90 tablet, Rfl: 3  •  lisinopril (PRINIVIL,ZESTRIL) 20 MG tablet, TAKE 1 TABLET BY MOUTH  DAILY, Disp: 90 tablet, Rfl: 3     Vitals:    03/18/21 0952   BP: 134/76   Pulse: 71   Temp: 97.5 °F (36.4 °C)   SpO2: 99%         03/18/21 0952   Weight: 97.6 kg (215 lb 3.2 oz)     Patient's Body mass index is 38.12 kg/m². BMI is above normal parameters. Recommendations include: exercise counseling and nutrition counseling.      Physical Exam  Constitutional:       Appearance: She is well-developed.   HENT:      Head: Normocephalic.      Right Ear: Tympanic membrane and external ear normal.      Left Ear: Tympanic membrane and external ear normal.      Nose: Nose normal.      Mouth/Throat:      Mouth: Mucous membranes are moist. No oral lesions.      Pharynx: Oropharynx is clear.   Eyes:      Conjunctiva/sclera: Conjunctivae normal.      Pupils: Pupils are equal, round, and reactive to light.   Neck:      Thyroid: No thyromegaly.      Vascular: No carotid bruit.   Cardiovascular:      Rate and Rhythm: Normal rate and regular rhythm.      Pulses: Normal pulses.           Radial pulses are 2+ on the right side and 2+ on the left side.      Heart sounds: Normal heart sounds. No murmur heard.     Pulmonary:       Effort: Pulmonary effort is normal.      Breath sounds: Normal breath sounds.   Chest:      Comments: Declines breast exam  Abdominal:      General: Bowel sounds are normal.      Palpations: Abdomen is soft.      Tenderness: There is no abdominal tenderness.   Musculoskeletal:      Cervical back: Normal range of motion.      Right lower leg: No edema.      Left lower leg: No edema.   Skin:     General: Skin is warm and dry.   Neurological:      Mental Status: She is alert and oriented to person, place, and time.      Gait: Gait normal.   Psychiatric:         Mood and Affect: Mood normal.         Speech: Speech normal.         Behavior: Behavior normal.         Thought Content: Thought content normal.               Assessment/Plan   Diagnoses and all orders for this visit:    1. Annual physical exam (Primary)    2. Other hyperlipidemia    3. Essential hypertension  -     Comprehensive Metabolic Panel; Future  -     CBC & Differential; Future  -     Lipid Panel; Future  -     Uric Acid; Future  -     Urinalysis With Microscopic - Urine, Clean Catch; Future  -     TSH; Future    4. Osteoporosis, postmenopausal  -     Vitamin D 25 hydroxy; Future    5. Breast cancer screening by mammogram  -     Mammo Screening Digital Tomosynthesis Bilateral With CAD; Future      She no longer gets pap smears.  She denies every having an abnormal one; denies any vaginal bleeding or other symptoms; there is a family history of endometrial cancer.  I have discussed risk factors and recommended pap smear.  Patient declines.  Educated on symptoms that she would need to monitor and seek treatment for.   BP is stable.  Will continue with current regimen.   She states her mood is good.  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  She states she is doing well.   Will check a vitamin D level for history of Vit D supplementation and Osteoporosis.  Discussed weight bearing exercise as well.   She will return for fasting labs.

## 2021-03-23 ENCOUNTER — IMMUNIZATION (OUTPATIENT)
Dept: VACCINE CLINIC | Facility: HOSPITAL | Age: 71
End: 2021-03-23

## 2021-03-23 PROCEDURE — 91301 HC SARSCO02 VAC 100MCG/0.5ML IM: CPT | Performed by: OBSTETRICS & GYNECOLOGY

## 2021-03-23 PROCEDURE — 0012A: CPT | Performed by: OBSTETRICS & GYNECOLOGY

## 2021-04-07 DIAGNOSIS — D50.9 IRON DEFICIENCY ANEMIA, UNSPECIFIED IRON DEFICIENCY ANEMIA TYPE: Primary | ICD-10-CM

## 2021-04-14 DIAGNOSIS — D50.9 IRON DEFICIENCY ANEMIA, UNSPECIFIED IRON DEFICIENCY ANEMIA TYPE: ICD-10-CM

## 2021-04-15 LAB — HEMOCCULT STL QL IA: NEGATIVE

## 2021-05-05 ENCOUNTER — TELEPHONE (OUTPATIENT)
Dept: INTERNAL MEDICINE | Facility: CLINIC | Age: 71
End: 2021-05-05

## 2021-05-05 NOTE — TELEPHONE ENCOUNTER
Patient was called and informed of the results. She will continue her current medications and follow up in September, sooner if needed.

## 2021-05-31 ENCOUNTER — APPOINTMENT (OUTPATIENT)
Dept: GENERAL RADIOLOGY | Facility: HOSPITAL | Age: 71
End: 2021-05-31

## 2021-05-31 ENCOUNTER — HOSPITAL ENCOUNTER (EMERGENCY)
Facility: HOSPITAL | Age: 71
Discharge: HOME | End: 2021-05-31
Admitting: EMERGENCY MEDICINE

## 2021-05-31 VITALS
BODY MASS INDEX: 38.27 KG/M2 | WEIGHT: 216 LBS | HEART RATE: 99 BPM | TEMPERATURE: 97.8 F | RESPIRATION RATE: 18 BRPM | HEIGHT: 63 IN | DIASTOLIC BLOOD PRESSURE: 71 MMHG | OXYGEN SATURATION: 100 % | SYSTOLIC BLOOD PRESSURE: 148 MMHG

## 2021-05-31 DIAGNOSIS — S61.012A LACERATION OF LEFT THUMB WITHOUT FOREIGN BODY WITHOUT DAMAGE TO NAIL, INITIAL ENCOUNTER: Primary | ICD-10-CM

## 2021-05-31 PROCEDURE — 73130 X-RAY EXAM OF HAND: CPT

## 2021-05-31 PROCEDURE — 25010000003 LIDOCAINE 1 % SOLUTION: Performed by: PHYSICIAN ASSISTANT

## 2021-05-31 PROCEDURE — 99283 EMERGENCY DEPT VISIT LOW MDM: CPT

## 2021-05-31 RX ORDER — CEPHALEXIN 500 MG/1
500 CAPSULE ORAL 2 TIMES DAILY
Qty: 14 CAPSULE | Refills: 0 | Status: SHIPPED | OUTPATIENT
Start: 2021-05-31 | End: 2021-06-07

## 2021-05-31 RX ORDER — LIDOCAINE HYDROCHLORIDE 10 MG/ML
10 INJECTION, SOLUTION INFILTRATION; PERINEURAL ONCE
Status: COMPLETED | OUTPATIENT
Start: 2021-05-31 | End: 2021-05-31

## 2021-05-31 RX ORDER — ACETAMINOPHEN 500 MG
1000 TABLET ORAL ONCE
Status: COMPLETED | OUTPATIENT
Start: 2021-05-31 | End: 2021-05-31

## 2021-05-31 RX ADMIN — LIDOCAINE HYDROCHLORIDE 10 ML: 10 INJECTION, SOLUTION INFILTRATION; PERINEURAL at 13:16

## 2021-05-31 RX ADMIN — ACETAMINOPHEN 1000 MG: 500 TABLET, FILM COATED ORAL at 13:16

## 2021-06-07 ENCOUNTER — OFFICE VISIT (OUTPATIENT)
Dept: INTERNAL MEDICINE | Facility: CLINIC | Age: 71
End: 2021-06-07

## 2021-06-07 VITALS
HEIGHT: 63 IN | WEIGHT: 217.6 LBS | BODY MASS INDEX: 38.55 KG/M2 | OXYGEN SATURATION: 98 % | HEART RATE: 73 BPM | DIASTOLIC BLOOD PRESSURE: 76 MMHG | TEMPERATURE: 97.1 F | SYSTOLIC BLOOD PRESSURE: 122 MMHG

## 2021-06-07 DIAGNOSIS — Z48.02 VISIT FOR SUTURE REMOVAL: ICD-10-CM

## 2021-06-07 DIAGNOSIS — S61.012D LACERATION OF LEFT THUMB WITHOUT FOREIGN BODY WITHOUT DAMAGE TO NAIL, SUBSEQUENT ENCOUNTER: Primary | ICD-10-CM

## 2021-06-07 PROCEDURE — 90715 TDAP VACCINE 7 YRS/> IM: CPT | Performed by: NURSE PRACTITIONER

## 2021-06-07 PROCEDURE — 99213 OFFICE O/P EST LOW 20 MIN: CPT | Performed by: NURSE PRACTITIONER

## 2021-06-07 PROCEDURE — 90471 IMMUNIZATION ADMIN: CPT | Performed by: NURSE PRACTITIONER

## 2021-06-07 NOTE — PROGRESS NOTES
Subjective   Serenity Hung is a 70 y.o. female.   Chief Complaint   Patient presents with   • ER follow up     [facility], Thumb Laceration, 5/31/2021. Requesting TDap vaccine.       Serenity presents today for ER follow up and suture removal after obtaining a laceration while cutting vegetables with a knife 1 week ago.  She denies concerns regarding the area.  She states the tip of her thumb is still a bit numb and will have pain if she hits her thumb on something.  Otherwise, she denies redness, drainage, or worsening symptoms.  She has covered the area with a bandaid while working around or outside the home, but then removes and allows it to dry.  She cleans the area in the shower with soap and water.         The following portions of the patient's history were reviewed and updated as appropriate: allergies, current medications, past family history, past medical history, past social history, past surgical history and problem list.    Review of Systems   Constitutional: Negative for activity change, appetite change, fatigue, fever, unexpected weight gain and unexpected weight loss.   HENT: Negative for swollen glands, trouble swallowing and voice change.    Eyes: Negative for blurred vision and visual disturbance.   Respiratory: Negative for cough and shortness of breath.    Cardiovascular: Negative for chest pain, palpitations and leg swelling.   Gastrointestinal: Negative for abdominal pain, constipation, diarrhea, nausea, vomiting and indigestion.   Endocrine: Negative for cold intolerance, heat intolerance, polydipsia and polyphagia.   Genitourinary: Negative for dysuria and frequency.   Musculoskeletal: Negative for arthralgias, back pain, joint swelling and neck pain.   Skin: Negative for color change, rash and skin lesions.        Laceration left thumb   Neurological: Negative for dizziness, weakness, headache, memory problem and confusion.   Hematological: Does not bruise/bleed easily.   Psychiatric/Behavioral:  Negative for agitation, hallucinations and suicidal ideas. The patient is not nervous/anxious.        Objective   Past Medical History:   Diagnosis Date   • Hyperlipidemia    • Hypertension       History reviewed. No pertinent surgical history.     Current Outpatient Medications:   •  amLODIPine (NORVASC) 5 MG tablet, Take 0.5 tablets by mouth Daily., Disp: 45 tablet, Rfl: 3  •  atorvastatin (LIPITOR) 20 MG tablet, TAKE 1 TABLET BY MOUTH  DAILY, Disp: 90 tablet, Rfl: 3  •  cephalexin (KEFLEX) 500 MG capsule, Take 1 capsule by mouth 2 (Two) Times a Day for 7 days., Disp: 14 capsule, Rfl: 0  •  hydroCHLOROthiazide (HYDRODIURIL) 25 MG tablet, Take 1 tablet by mouth Daily., Disp: 90 tablet, Rfl: 3  •  lisinopril (PRINIVIL,ZESTRIL) 20 MG tablet, TAKE 1 TABLET BY MOUTH  DAILY, Disp: 90 tablet, Rfl: 3     Vitals:    06/07/21 0825   BP: 122/76   Pulse: 73   Temp: 97.1 °F (36.2 °C)   SpO2: 98%         06/07/21  0825   Weight: 98.7 kg (217 lb 9.6 oz)           Physical Exam  Constitutional:       General: She is not in acute distress.     Appearance: She is well-developed.   HENT:      Head: Normocephalic.      Right Ear: External ear normal.      Left Ear: External ear normal.      Mouth/Throat:      Mouth: No oral lesions.   Eyes:      Conjunctiva/sclera: Conjunctivae normal.   Cardiovascular:      Rate and Rhythm: Normal rate and regular rhythm.      Heart sounds: Normal heart sounds.   Pulmonary:      Effort: Pulmonary effort is normal.      Breath sounds: Normal breath sounds.   Skin:     General: Skin is warm and dry.      Comments: Dry, healing laceration to to padding of left thumb.  No erythema.  The sutures are in place and dry with mild scabbing noted.  There is still minimal edema to the suture line.  No drainage.     Neurological:      Mental Status: She is alert and oriented to person, place, and time.      Gait: Gait normal.   Psychiatric:         Mood and Affect: Mood normal.         Speech: Speech normal.          Behavior: Behavior normal.         Thought Content: Thought content normal.       Suture Removal    Date/Time: 6/7/2021 8:51 AM  Performed by: Venice Ordoñez APRN  Authorized by: Venice Ordoñez APRN   Body area: upper extremity  Location details: left thumb  Wound Appearance: clean and pink  Sutures Removed: 4  Post-removal: Steri-Strips applied (2)  Patient tolerance: patient tolerated the procedure well with no immediate complications              Assessment/Plan   Diagnoses and all orders for this visit:    1. Laceration of left thumb without foreign body without damage to nail, subsequent encounter (Primary)    2. Visit for suture removal      Laceration healing well.  2 steri strips placed for support.  Discussed keeping the area clean with soap and water.  Can apply triple antibiotic ointment as well.  Steri strips to fall off when ready.  Discussed signs of infection to monitor for.    TDap updated in office today.

## 2021-07-07 ENCOUNTER — OFFICE VISIT (OUTPATIENT)
Dept: INTERNAL MEDICINE | Facility: CLINIC | Age: 71
End: 2021-07-07

## 2021-07-07 VITALS
DIASTOLIC BLOOD PRESSURE: 88 MMHG | OXYGEN SATURATION: 98 % | SYSTOLIC BLOOD PRESSURE: 154 MMHG | TEMPERATURE: 97.3 F | BODY MASS INDEX: 38.84 KG/M2 | WEIGHT: 219.2 LBS | HEART RATE: 81 BPM | HEIGHT: 63 IN

## 2021-07-07 DIAGNOSIS — L23.7 ALLERGIC CONTACT DERMATITIS DUE TO PLANTS, EXCEPT FOOD: Primary | ICD-10-CM

## 2021-07-07 PROCEDURE — 99213 OFFICE O/P EST LOW 20 MIN: CPT | Performed by: NURSE PRACTITIONER

## 2021-07-07 RX ORDER — METHYLPREDNISOLONE 4 MG/1
TABLET ORAL
Qty: 1 EACH | Refills: 0 | Status: SHIPPED | OUTPATIENT
Start: 2021-07-07 | End: 2021-09-21

## 2021-07-07 NOTE — PROGRESS NOTES
"Subjective   Serenity Hung is a 70 y.o. female.   Chief Complaint   Patient presents with   • Eye Problem     Right eye- itching, red, tender, dry/flaky x 1 week       Serenity presents today for complaints of a rash to the right eye.  She reports >1 week ago she was working in the yard when she started to feel a itch to the right eye.  She touched the corner of her right eye while wearing her working gloves.  She states within 2 days she started having more itching and rash development.  She states is worsened and \"looked bad\" for about 1 week.  She started applying hydrocortisone topically to the area around her eye, being careful not to get the cream in the eye.  She states within about 4 days, the rash resolved.  She states < 1 week later the rash started returning.  She has since been putting Aveeno, non steroid rash cream to the area and has noticed some improvement today. She denies any eye symptoms.       The following portions of the patient's history were reviewed and updated as appropriate: allergies, current medications, past family history, past medical history, past social history, past surgical history and problem list.    Review of Systems   Constitutional: Negative for activity change, appetite change, fatigue, fever, unexpected weight gain and unexpected weight loss.   HENT: Negative for swollen glands, trouble swallowing and voice change.    Eyes: Negative for blurred vision, pain, discharge, redness and visual disturbance.   Respiratory: Negative for cough and shortness of breath.    Cardiovascular: Negative for chest pain, palpitations and leg swelling.   Gastrointestinal: Negative for abdominal pain, constipation, diarrhea, nausea, vomiting and indigestion.   Endocrine: Negative for cold intolerance, heat intolerance, polydipsia and polyphagia.   Genitourinary: Negative for dysuria and frequency.   Musculoskeletal: Negative for arthralgias, back pain, joint swelling and neck pain.   Skin: " Positive for rash. Negative for color change and skin lesions.   Neurological: Negative for dizziness, weakness, headache, memory problem and confusion.   Hematological: Does not bruise/bleed easily.   Psychiatric/Behavioral: Negative for agitation, hallucinations and suicidal ideas. The patient is not nervous/anxious.        Objective   Past Medical History:   Diagnosis Date   • Hyperlipidemia    • Hypertension       History reviewed. No pertinent surgical history.     Current Outpatient Medications:   •  amLODIPine (NORVASC) 5 MG tablet, Take 0.5 tablets by mouth Daily., Disp: 45 tablet, Rfl: 3  •  atorvastatin (LIPITOR) 20 MG tablet, TAKE 1 TABLET BY MOUTH  DAILY, Disp: 90 tablet, Rfl: 3  •  hydroCHLOROthiazide (HYDRODIURIL) 25 MG tablet, Take 1 tablet by mouth Daily., Disp: 90 tablet, Rfl: 3  •  lisinopril (PRINIVIL,ZESTRIL) 20 MG tablet, TAKE 1 TABLET BY MOUTH  DAILY, Disp: 90 tablet, Rfl: 3  •  methylPREDNISolone (MEDROL) 4 MG dose pack, Take as directed on package instructions., Disp: 1 each, Rfl: 0     Vitals:    07/07/21 1100   BP: 154/88   Pulse: 81   Temp: 97.3 °F (36.3 °C)   SpO2: 98%         07/07/21  1100   Weight: 99.4 kg (219 lb 3.2 oz)         Physical Exam  Constitutional:       General: She is not in acute distress.     Appearance: She is well-developed.   HENT:      Head: Normocephalic.        Comments: Puffy, light erythema rash around the right eye.  No periorbital tenderness, no direct eye involvement.  2 small, dry pink rash areas noted to each corner of the left eye.      Right Ear: External ear normal.      Left Ear: External ear normal.      Mouth/Throat:      Mouth: Mucous membranes are moist. No oral lesions.      Pharynx: Oropharynx is clear.   Eyes:      Conjunctiva/sclera: Conjunctivae normal.      Right eye: Right conjunctiva is not injected.      Left eye: Left conjunctiva is not injected.   Cardiovascular:      Rate and Rhythm: Normal rate and regular rhythm.      Heart sounds:  Normal heart sounds.   Pulmonary:      Effort: Pulmonary effort is normal.      Breath sounds: Normal breath sounds.   Skin:     General: Skin is warm and dry.   Neurological:      Mental Status: She is alert and oriented to person, place, and time.      Gait: Gait normal.   Psychiatric:         Mood and Affect: Mood normal.         Speech: Speech normal.         Behavior: Behavior normal.         Thought Content: Thought content normal.               Assessment/Plan   Diagnoses and all orders for this visit:    1. Allergic contact dermatitis due to plants, except food (Primary)  -     methylPREDNISolone (MEDROL) 4 MG dose pack; Take as directed on package instructions.  Dispense: 1 each; Refill: 0      Will cover with medrol dose pack at this time.  Advised she start a daily Zyrtec, Claritin, or Benadryl at bedtime.  Avoid topical steroids to the face if possible.  I have discussed side effects of steroid pack and encouraged she monitor her blood pressure.  BP is usually well controlled in the office visits and patient states she has been running her usual at home as well.  Requested she monitor this.  If no improvement or symptoms worsen, notify the office.  If she develops any eye pain, drainage, or vision changes, see eye doctor asap.

## 2021-07-19 ENCOUNTER — TELEPHONE (OUTPATIENT)
Dept: INTERNAL MEDICINE | Facility: CLINIC | Age: 71
End: 2021-07-19

## 2021-07-19 DIAGNOSIS — Z12.31 BREAST CANCER SCREENING BY MAMMOGRAM: ICD-10-CM

## 2021-07-19 NOTE — TELEPHONE ENCOUNTER
----- Message from DESEAN Contreras sent at 7/19/2021  7:53 AM CDT -----  Normal mammogram- repeat in 1 year

## 2021-07-20 ENCOUNTER — APPOINTMENT (OUTPATIENT)
Dept: MAMMOGRAPHY | Facility: HOSPITAL | Age: 71
End: 2021-07-20

## 2021-09-21 ENCOUNTER — OFFICE VISIT (OUTPATIENT)
Dept: INTERNAL MEDICINE | Facility: CLINIC | Age: 71
End: 2021-09-21

## 2021-09-21 VITALS
HEART RATE: 81 BPM | SYSTOLIC BLOOD PRESSURE: 132 MMHG | HEIGHT: 63 IN | DIASTOLIC BLOOD PRESSURE: 76 MMHG | BODY MASS INDEX: 38.09 KG/M2 | TEMPERATURE: 97.3 F | WEIGHT: 215 LBS | OXYGEN SATURATION: 99 %

## 2021-09-21 DIAGNOSIS — E78.49 OTHER HYPERLIPIDEMIA: ICD-10-CM

## 2021-09-21 DIAGNOSIS — Z87.01 HISTORY OF PNEUMONIA AS INDICATION FOR 23-POLYVALENT PNEUMOCOCCAL POLYSACCHARIDE VACCINE: ICD-10-CM

## 2021-09-21 DIAGNOSIS — Z91.89 HISTORY OF PNEUMONIA AS INDICATION FOR 23-POLYVALENT PNEUMOCOCCAL POLYSACCHARIDE VACCINE: ICD-10-CM

## 2021-09-21 DIAGNOSIS — I10 ESSENTIAL HYPERTENSION: Primary | ICD-10-CM

## 2021-09-21 DIAGNOSIS — Z23 NEED FOR 23-POLYVALENT PNEUMOCOCCAL POLYSACCHARIDE VACCINE: ICD-10-CM

## 2021-09-21 PROCEDURE — 99214 OFFICE O/P EST MOD 30 MIN: CPT | Performed by: NURSE PRACTITIONER

## 2021-09-21 NOTE — ADDENDUM NOTE
Addended by: TIAGO VAZQUEZ on: 9/21/2021 08:40 AM     Modules accepted: Orders     noted below, none     Procedures    FINAL IMPRESSION      1.  Mild intermittent reactive airway disease without complication          DISPOSITION/PLAN   DISPOSITION Decision to Discharge    PATIENT REFERRED TO:  Brett Cohen MD  [address] [place] [address]  [phone]    In 2 days        DISCHARGE MEDICATIONS:  Discharge Medication List as of 12/3/2017  4:30 PM      START taking these medications    Details   albuterol (PROVENTIL) (5 MG/ML) 0.5% nebulizer solution Take 0.5 mLs by nebulization every 6 hours as needed for Wheezing, Disp-30 vial, R-0Print                (Please note that portions of this note were completed with a voice recognition program.  Efforts were made to edit the dictations but occasionally words are mis-transcribed.)    Cris Chirinos MD (electronically signed)  Attending Emergency Physician          Britney Márquez MD  12/03/17 [id number]

## 2021-09-21 NOTE — PROGRESS NOTES
Subjective   Serenity Hung is a 71 y.o. female.   Chief Complaint   Patient presents with   • Hypertension     6 month follow up       Serenity presents today for a 6 month follow up on hypertension.  She reports she is doing well and tolerating medication without difficulty.  She monitors BP at home occasionally and reports readings around 120-130s/70s.  She denies chest pain or shortness of breath.  She has no other concerns to address today.        The following portions of the patient's history were reviewed and updated as appropriate: allergies, current medications, past family history, past medical history, past social history, past surgical history and problem list.    Review of Systems   Constitutional: Negative for activity change, appetite change, fatigue, fever, unexpected weight gain and unexpected weight loss.   HENT: Negative for swollen glands, trouble swallowing and voice change.    Eyes: Negative for blurred vision and visual disturbance.   Respiratory: Negative for cough and shortness of breath.    Cardiovascular: Negative for chest pain, palpitations and leg swelling.   Gastrointestinal: Negative for abdominal pain, constipation, diarrhea, nausea, vomiting and indigestion.   Endocrine: Negative for cold intolerance, heat intolerance, polydipsia and polyphagia.   Genitourinary: Negative for dysuria and frequency.   Musculoskeletal: Negative for arthralgias, back pain, joint swelling and neck pain.   Skin: Negative for color change, rash and skin lesions.   Neurological: Negative for dizziness, weakness, headache, memory problem and confusion.   Hematological: Does not bruise/bleed easily.   Psychiatric/Behavioral: Negative for agitation, hallucinations and suicidal ideas. The patient is not nervous/anxious.        Objective   Past Medical History:   Diagnosis Date   • Hyperlipidemia    • Hypertension       History reviewed. No pertinent surgical history.     Current Outpatient Medications:   •   amLODIPine (NORVASC) 5 MG tablet, Take 0.5 tablets by mouth Daily., Disp: 45 tablet, Rfl: 3  •  atorvastatin (LIPITOR) 20 MG tablet, TAKE 1 TABLET BY MOUTH  DAILY, Disp: 90 tablet, Rfl: 3  •  hydroCHLOROthiazide (HYDRODIURIL) 25 MG tablet, Take 1 tablet by mouth Daily., Disp: 90 tablet, Rfl: 3  •  lisinopril (PRINIVIL,ZESTRIL) 20 MG tablet, TAKE 1 TABLET BY MOUTH  DAILY, Disp: 90 tablet, Rfl: 3      Vitals:    09/21/21 0805   BP: 132/76   Pulse: 81   Temp: 97.3 °F (36.3 °C)   SpO2: 99%          09/21/21 0805   Weight: 97.5 kg (215 lb)       Body mass index is 38.09 kg/m².    Physical Exam  HENT:      Right Ear: Tympanic membrane and external ear normal.      Left Ear: Tympanic membrane and external ear normal.      Nose: Nose normal.      Mouth/Throat:      Mouth: Mucous membranes are moist. No oral lesions.      Pharynx: Oropharynx is clear.   Eyes:      Conjunctiva/sclera: Conjunctivae normal.      Pupils: Pupils are equal, round, and reactive to light.   Neck:      Thyroid: No thyromegaly.   Cardiovascular:      Rate and Rhythm: Normal rate and regular rhythm.      Pulses: Normal pulses.           Radial pulses are 2+ on the right side and 2+ on the left side.      Heart sounds: Normal heart sounds.   Pulmonary:      Effort: Pulmonary effort is normal.      Breath sounds: Normal breath sounds.   Musculoskeletal:      Cervical back: Normal range of motion.      Right lower leg: No edema.      Left lower leg: No edema.   Lymphadenopathy:      Cervical: No cervical adenopathy.   Skin:     General: Skin is warm and dry.   Neurological:      Mental Status: She is alert and oriented to person, place, and time.      Gait: Gait normal.   Psychiatric:         Mood and Affect: Mood normal.         Speech: Speech normal.         Behavior: Behavior normal.         Thought Content: Thought content normal.               Assessment/Plan   Diagnoses and all orders for this visit:    1. Essential hypertension (Primary)    2.  Other hyperlipidemia  -     Basic Metabolic Panel    3. History of pneumonia as indication for 23-polyvalent pneumococcal polysaccharide vaccine      BP is stable.  No changes to regimen at this time. Advised to continue monitoring.   Will update Pneumovax 23 while in office today.  No high dose flu vaccine available at this time but encouraged she get this when available.    Will check bmp today to check renal status.

## 2021-09-22 LAB
BUN SERPL-MCNC: 10 MG/DL (ref 8–23)
BUN/CREAT SERPL: 14.1 (ref 7–25)
CALCIUM SERPL-MCNC: 9.4 MG/DL (ref 8.6–10.5)
CHLORIDE SERPL-SCNC: 90 MMOL/L (ref 98–107)
CO2 SERPL-SCNC: 29.9 MMOL/L (ref 22–29)
CREAT SERPL-MCNC: 0.71 MG/DL (ref 0.57–1)
GLUCOSE SERPL-MCNC: 102 MG/DL (ref 65–99)
POTASSIUM SERPL-SCNC: 3.9 MMOL/L (ref 3.5–5.2)
SODIUM SERPL-SCNC: 132 MMOL/L (ref 136–145)

## 2021-09-23 ENCOUNTER — TELEPHONE (OUTPATIENT)
Dept: INTERNAL MEDICINE | Facility: CLINIC | Age: 71
End: 2021-09-23

## 2021-09-23 NOTE — TELEPHONE ENCOUNTER
----- Message from DESEAN Contreras sent at 9/22/2021  6:13 AM CDT -----  BMP ok.  Sodium is low which is chronic.  Can restrict her fluids some to help elevate this.

## 2021-09-29 ENCOUNTER — TELEPHONE (OUTPATIENT)
Dept: INTERNAL MEDICINE | Facility: CLINIC | Age: 71
End: 2021-09-29

## 2021-09-29 NOTE — TELEPHONE ENCOUNTER
Caller: Serenity Hung    Relationship to patient: Self    Best call back number:[phone]    Patient is needing: TO SCHEDULE A FLU SHOT

## 2021-10-05 ENCOUNTER — CLINICAL SUPPORT (OUTPATIENT)
Dept: INTERNAL MEDICINE | Facility: CLINIC | Age: 71
End: 2021-10-05

## 2021-10-05 DIAGNOSIS — Z23 NEED FOR INFLUENZA VACCINATION: Primary | ICD-10-CM

## 2021-10-05 PROCEDURE — G0008 ADMIN INFLUENZA VIRUS VAC: HCPCS | Performed by: NURSE PRACTITIONER

## 2021-10-05 PROCEDURE — 90662 IIV NO PRSV INCREASED AG IM: CPT | Performed by: NURSE PRACTITIONER

## 2021-11-22 DIAGNOSIS — I10 ESSENTIAL HYPERTENSION: ICD-10-CM

## 2021-11-23 RX ORDER — HYDROCHLOROTHIAZIDE 25 MG/1
25 TABLET ORAL DAILY
Qty: 90 TABLET | Refills: 1 | Status: SHIPPED | OUTPATIENT
Start: 2021-11-23 | End: 2022-05-12 | Stop reason: SDUPTHER

## 2021-11-23 RX ORDER — AMLODIPINE BESYLATE 5 MG/1
TABLET ORAL
Qty: 45 TABLET | Refills: 1 | Status: SHIPPED | OUTPATIENT
Start: 2021-11-23 | End: 2022-05-12 | Stop reason: SDUPTHER

## 2021-12-29 RX ORDER — ATORVASTATIN CALCIUM 20 MG/1
TABLET, FILM COATED ORAL
Qty: 90 TABLET | Refills: 3 | Status: SHIPPED | OUTPATIENT
Start: 2021-12-29 | End: 2022-12-02 | Stop reason: SDUPTHER

## 2022-01-14 RX ORDER — LISINOPRIL 20 MG/1
TABLET ORAL
Qty: 90 TABLET | Refills: 1 | Status: SHIPPED | OUTPATIENT
Start: 2022-01-14 | End: 2022-06-30 | Stop reason: SDUPTHER

## 2022-03-22 ENCOUNTER — OFFICE VISIT (OUTPATIENT)
Dept: INTERNAL MEDICINE | Facility: CLINIC | Age: 72
End: 2022-03-22

## 2022-03-22 VITALS
BODY MASS INDEX: 37.39 KG/M2 | WEIGHT: 211 LBS | HEIGHT: 63 IN | HEART RATE: 87 BPM | SYSTOLIC BLOOD PRESSURE: 140 MMHG | DIASTOLIC BLOOD PRESSURE: 80 MMHG | TEMPERATURE: 96.9 F | OXYGEN SATURATION: 99 %

## 2022-03-22 DIAGNOSIS — Z00.00 ANNUAL PHYSICAL EXAM: ICD-10-CM

## 2022-03-22 DIAGNOSIS — Z00.00 ENCOUNTER FOR SUBSEQUENT ANNUAL WELLNESS VISIT IN MEDICARE PATIENT: ICD-10-CM

## 2022-03-22 DIAGNOSIS — E78.49 OTHER HYPERLIPIDEMIA: ICD-10-CM

## 2022-03-22 DIAGNOSIS — I10 ESSENTIAL HYPERTENSION: Primary | ICD-10-CM

## 2022-03-22 DIAGNOSIS — M81.0 OSTEOPOROSIS, POSTMENOPAUSAL: ICD-10-CM

## 2022-03-22 PROCEDURE — 1159F MED LIST DOCD IN RCRD: CPT | Performed by: NURSE PRACTITIONER

## 2022-03-22 PROCEDURE — G0439 PPPS, SUBSEQ VISIT: HCPCS | Performed by: NURSE PRACTITIONER

## 2022-03-22 PROCEDURE — 1126F AMNT PAIN NOTED NONE PRSNT: CPT | Performed by: NURSE PRACTITIONER

## 2022-03-22 PROCEDURE — 1170F FXNL STATUS ASSESSED: CPT | Performed by: NURSE PRACTITIONER

## 2022-03-22 NOTE — PROGRESS NOTES
The ABCs of the Annual Wellness Visit  Subsequent Medicare Wellness Visit    Chief Complaint   Patient presents with   • Medicare Wellness-subsequent      Subjective    History of Present Illness:  Serenity Hung is a 71 y.o. female who presents for a Subsequent Medicare Wellness Visit.  She denies any concerns to address today.  She does not routinely monitor her BP at home but states it was recently checked at the dentist and was around 130/70.  She has been active in her yard and states she tolerates this activity without difficulty.     The following portions of the patient's history were reviewed and   updated as appropriate: allergies, current medications, past family history, past medical history, past social history, past surgical history and problem list.    Compared to one year ago, the patient feels her physical   health is the same.    Compared to one year ago, the patient feels her mental   health is the same.    Recent Hospitalizations:  She was not admitted to the hospital during the last year.       Current Medical Providers:  Patient Care Team:  Venice Ordoñez APRN as PCP - General (Family Medicine)    Outpatient Medications Prior to Visit   Medication Sig Dispense Refill   • amLODIPine (NORVASC) 5 MG tablet TAKE ONE-HALF TABLET BY  MOUTH DAILY 45 tablet 1   • atorvastatin (LIPITOR) 20 MG tablet TAKE 1 TABLET BY MOUTH  DAILY 90 tablet 3   • hydroCHLOROthiazide (HYDRODIURIL) 25 MG tablet TAKE 1 TABLET BY MOUTH  DAILY 90 tablet 1   • lisinopril (PRINIVIL,ZESTRIL) 20 MG tablet TAKE 1 TABLET BY MOUTH  DAILY 90 tablet 1     No facility-administered medications prior to visit.       No opioid medication identified on active medication list. I have reviewed chart for other potential  high risk medication/s and harmful drug interactions in the elderly.          Aspirin is not on active medication list.  Does not tolerate Aspirin.    Patient Active Problem List   Diagnosis   • Hypertensive disorder   •  "Morbidly obese (HCC)   • At risk for falls   • Other hyperlipidemia   • Osteoporosis, postmenopausal     Advance Care Planning  Advance Directive is not on file.  ACP discussion was held with the patient during this visit. Patient does not have an advance directive, information provided.    Review of Systems   Constitutional: Negative for activity change, appetite change and fatigue.   HENT: Negative for congestion, sinus pressure and trouble swallowing.    Eyes: Negative for pain, discharge, redness and itching.   Respiratory: Negative for cough and shortness of breath.    Cardiovascular: Negative for chest pain, palpitations and leg swelling.   Gastrointestinal: Negative for abdominal pain, constipation, nausea and vomiting.   Endocrine: Negative for cold intolerance, heat intolerance, polydipsia, polyphagia and polyuria.   Genitourinary: Negative for dysuria, hematuria, vaginal bleeding and vaginal pain.   Musculoskeletal: Negative for arthralgias, back pain and myalgias.   Skin: Negative for rash.   Allergic/Immunologic: Positive for environmental allergies.   Neurological: Negative for dizziness and weakness.   Psychiatric/Behavioral: The patient is not nervous/anxious.         Objective    Vitals:    03/22/22 0811   BP: 140/80   BP Location: Left arm   Patient Position: Sitting   Cuff Size: Adult   Pulse: 87   Temp: 96.9 °F (36.1 °C)   TempSrc: Temporal   SpO2: 99%   Weight: 95.7 kg (211 lb)   Height: 160 cm (63\")   PainSc: 0-No pain     BMI Readings from Last 1 Encounters:   03/22/22 37.38 kg/m²   BMI is above normal parameters. Recommendations include: educational material, exercise counseling and nutrition counseling.  Yard work and walking.     Does the patient have evidence of cognitive impairment? No    Physical Exam  Constitutional:       Appearance: She is well-developed.   HENT:      Head: Normocephalic.      Right Ear: Tympanic membrane and external ear normal.      Left Ear: Tympanic membrane and " external ear normal.      Nose: Nose normal.      Mouth/Throat:      Mouth: Mucous membranes are moist. No oral lesions.      Pharynx: Oropharynx is clear.   Eyes:      Conjunctiva/sclera: Conjunctivae normal.      Pupils: Pupils are equal, round, and reactive to light.   Neck:      Thyroid: No thyromegaly.      Vascular: No carotid bruit.   Cardiovascular:      Rate and Rhythm: Normal rate and regular rhythm.      Pulses: Normal pulses.           Radial pulses are 2+ on the right side and 2+ on the left side.      Heart sounds: Normal heart sounds. No murmur heard.  Pulmonary:      Effort: Pulmonary effort is normal.      Breath sounds: Normal breath sounds.   Chest:      Comments: Declines breast exam  Abdominal:      General: Bowel sounds are normal.      Palpations: Abdomen is soft.      Tenderness: There is no abdominal tenderness.   Musculoskeletal:      Cervical back: Normal range of motion.      Right lower leg: No edema.      Left lower leg: No edema.   Skin:     General: Skin is warm and dry.   Neurological:      Mental Status: She is alert and oriented to person, place, and time.      Gait: Gait normal.   Psychiatric:         Mood and Affect: Mood normal.         Speech: Speech normal.         Behavior: Behavior normal.         Thought Content: Thought content normal.                 HEALTH RISK ASSESSMENT    Smoking Status:  Social History     Tobacco Use   Smoking Status Never Smoker   Smokeless Tobacco Never Used     Alcohol Consumption:  Social History     Substance and Sexual Activity   Alcohol Use Never     Fall Risk Screen:    [facility] Fall Risk Assessment was completed, and patient is at LOW risk for falls.Assessment completed on:3/22/2022    Depression Screening:  PHQ-2/PHQ-9 Depression Screening 3/22/2022   Retired Total Score -   Little Interest or Pleasure in Doing Things 0-->not at all   Feeling Down, Depressed or Hopeless 0-->not at all   PHQ-9: Brief Depression Severity Measure Score 0        Health Habits and Functional and Cognitive Screening:  Functional & Cognitive Status 3/22/2022   Do you have difficulty preparing food and eating? No   Do you have difficulty bathing yourself, getting dressed or grooming yourself? No   Do you have difficulty using the toilet? No   Do you have difficulty moving around from place to place? No   Do you have trouble with steps or getting out of a bed or a chair? No   Current Diet Unhealthy Diet   Dental Exam Up to date   Eye Exam Up to date   Exercise (times per week) 3 times per week   Current Exercises Include Walking   Current Exercise Activities Include -   Do you need help using the phone?  No   Are you deaf or do you have serious difficulty hearing?  No   Do you need help with transportation? No   Do you need help shopping? No   Do you need help preparing meals?  No   Do you need help with housework?  No   Do you need help with laundry? No   Do you need help taking your medications? No   Do you need help managing money? No   Do you ever drive or ride in a car without wearing a seat belt? No   Have you felt unusual stress, anger or loneliness in the last month? No   Who do you live with? Other   If you need help, do you have trouble finding someone available to you? No   Have you been bothered in the last four weeks by sexual problems? No   Do you have difficulty concentrating, remembering or making decisions? No       Age-appropriate Screening Schedule:  Refer to the list below for future screening recommendations based on patient's age, sex and/or medical conditions. Orders for these recommended tests are listed in the plan section. The patient has been provided with a written plan.    Health Maintenance   Topic Date Due   • ZOSTER VACCINE (1 of 2) Never done   • LIPID PANEL  04/02/2022   • DXA SCAN  07/31/2022   • MAMMOGRAM  07/16/2023   • TDAP/TD VACCINES (2 - Td or Tdap) 06/07/2031   • INFLUENZA VACCINE  Completed              Assessment/Plan   CMS  Preventative Services Quick Reference  Risk Factors Identified During Encounter  Cardiovascular Disease  Chronic Pain   Depression/Dysphoria  Hearing Problem  Immunizations Discussed/Encouraged (specific Immunizations; Tdap, Influenza, Pneumococcal 23, Shingrix and COVID19  Inadequate Social Support, Isolation, Loneliness, Lack of Transportation, Financial Difficulties, or Caregiver Stress   Inactivity/Sedentary  Obesity/Overweight   The above risks/problems have been discussed with the patient.  Follow up actions/plans if indicated are seen below in the Assessment/Plan Section.  Pertinent information has been shared with the patient in the After Visit Summary.    Diagnoses and all orders for this visit:    1. Essential hypertension (Primary)  -     Comprehensive Metabolic Panel  -     CBC & Differential  -     Lipid Panel  -     Uric Acid  -     TSH  -     Urinalysis With Microscopic - Urine, Clean Catch    2. Other hyperlipidemia    3. Osteoporosis, postmenopausal  -     Vitamin D 25 hydroxy    4. Annual physical exam    5. Encounter for subsequent annual wellness visit in Medicare patient        Follow Up:   Return in about 6 months (around 9/22/2022) for Recheck.     An After Visit Summary and PPPS were made available to the patient.        I spent 30 minutes caring for Serenity on this date of service. This time includes time spent by me in the following activities:preparing for the visit, reviewing tests, obtaining and/or reviewing a separately obtained history, performing a medically appropriate examination and/or evaluation , counseling and educating the patient/family/caregiver and documenting information in the medical record       BP is 140/80 today.  She states it was recently checked at dentist and it was better.  She does not typically check this at home herself.  Requested she monitor this once daily at home and report readings for the next few days.  Will need to adjust BP medication if consistently  greater than 130/80.  She states rushing around this morning to get her Mother to the doctor may be contributing to it's elevation.    She is due for mammogram/Dexa in July 2022.    Last colonoscopy was 2015.  Had test for occult blood in 2021.    Discussed Shingrix vaccine and recommended she talk with pharmacist about getting this completed.    Will get fasting labs today.

## 2022-03-23 LAB
25(OH)D3+25(OH)D2 SERPL-MCNC: 31.4 NG/ML (ref 30–100)
ALBUMIN SERPL-MCNC: 4.7 G/DL (ref 3.5–5.2)
ALBUMIN/GLOB SERPL: 1.9 G/DL
ALP SERPL-CCNC: 62 U/L (ref 39–117)
ALT SERPL-CCNC: 17 U/L (ref 1–33)
APPEARANCE UR: CLEAR
AST SERPL-CCNC: 16 U/L (ref 1–32)
BACTERIA #/AREA URNS HPF: ABNORMAL /HPF
BASOPHILS # BLD AUTO: 0.07 10*3/MM3 (ref 0–0.2)
BASOPHILS NFR BLD AUTO: 0.9 % (ref 0–1.5)
BILIRUB SERPL-MCNC: 1.7 MG/DL (ref 0–1.2)
BILIRUB UR QL STRIP: NEGATIVE
BUN SERPL-MCNC: 12 MG/DL (ref 8–23)
BUN/CREAT SERPL: 14.6 (ref 7–25)
CALCIUM SERPL-MCNC: 9.7 MG/DL (ref 8.6–10.5)
CASTS URNS MICRO: ABNORMAL
CHLORIDE SERPL-SCNC: 92 MMOL/L (ref 98–107)
CHOLEST SERPL-MCNC: 128 MG/DL (ref 0–200)
CO2 SERPL-SCNC: 28.6 MMOL/L (ref 22–29)
COLOR UR: YELLOW
CREAT SERPL-MCNC: 0.82 MG/DL (ref 0.57–1)
EGFRCR SERPLBLD CKD-EPI 2021: 75.6 ML/MIN/1.73
EOSINOPHIL # BLD AUTO: 0.1 10*3/MM3 (ref 0–0.4)
EOSINOPHIL NFR BLD AUTO: 1.2 % (ref 0.3–6.2)
EPI CELLS #/AREA URNS HPF: ABNORMAL /HPF
ERYTHROCYTE [DISTWIDTH] IN BLOOD BY AUTOMATED COUNT: 12.5 % (ref 12.3–15.4)
GLOBULIN SER CALC-MCNC: 2.5 GM/DL
GLUCOSE SERPL-MCNC: 103 MG/DL (ref 65–99)
GLUCOSE UR QL STRIP: NEGATIVE
HCT VFR BLD AUTO: 43.1 % (ref 34–46.6)
HDLC SERPL-MCNC: 52 MG/DL (ref 40–60)
HGB BLD-MCNC: 13.9 G/DL (ref 12–15.9)
HGB UR QL STRIP: ABNORMAL
IMM GRANULOCYTES # BLD AUTO: 0.03 10*3/MM3 (ref 0–0.05)
IMM GRANULOCYTES NFR BLD AUTO: 0.4 % (ref 0–0.5)
KETONES UR QL STRIP: NEGATIVE
LDLC SERPL CALC-MCNC: 58 MG/DL (ref 0–100)
LEUKOCYTE ESTERASE UR QL STRIP: NEGATIVE
LYMPHOCYTES # BLD AUTO: 1.85 10*3/MM3 (ref 0.7–3.1)
LYMPHOCYTES NFR BLD AUTO: 22.9 % (ref 19.6–45.3)
MCH RBC QN AUTO: 27.6 PG (ref 26.6–33)
MCHC RBC AUTO-ENTMCNC: 32.3 G/DL (ref 31.5–35.7)
MCV RBC AUTO: 85.7 FL (ref 79–97)
MONOCYTES # BLD AUTO: 0.47 10*3/MM3 (ref 0.1–0.9)
MONOCYTES NFR BLD AUTO: 5.8 % (ref 5–12)
NEUTROPHILS # BLD AUTO: 5.56 10*3/MM3 (ref 1.7–7)
NEUTROPHILS NFR BLD AUTO: 68.8 % (ref 42.7–76)
NITRITE UR QL STRIP: NEGATIVE
NRBC BLD AUTO-RTO: 0 /100 WBC (ref 0–0.2)
PH UR STRIP: 7 [PH] (ref 5–8)
PLATELET # BLD AUTO: 292 10*3/MM3 (ref 140–450)
POTASSIUM SERPL-SCNC: 4.4 MMOL/L (ref 3.5–5.2)
PROT SERPL-MCNC: 7.2 G/DL (ref 6–8.5)
PROT UR QL STRIP: NEGATIVE
RBC # BLD AUTO: 5.03 10*6/MM3 (ref 3.77–5.28)
RBC #/AREA URNS HPF: ABNORMAL /HPF
SODIUM SERPL-SCNC: 132 MMOL/L (ref 136–145)
SP GR UR STRIP: 1.02 (ref 1–1.03)
TRIGL SERPL-MCNC: 97 MG/DL (ref 0–150)
TSH SERPL DL<=0.005 MIU/L-ACNC: 1.59 UIU/ML (ref 0.27–4.2)
URATE SERPL-MCNC: 6.2 MG/DL (ref 2.4–5.7)
UROBILINOGEN UR STRIP-MCNC: ABNORMAL MG/DL
VLDLC SERPL CALC-MCNC: 18 MG/DL (ref 5–40)
WBC # BLD AUTO: 8.08 10*3/MM3 (ref 3.4–10.8)
WBC #/AREA URNS HPF: ABNORMAL /HPF

## 2022-04-20 ENCOUNTER — CLINICAL SUPPORT (OUTPATIENT)
Dept: INTERNAL MEDICINE | Facility: CLINIC | Age: 72
End: 2022-04-20

## 2022-04-20 DIAGNOSIS — R82.90 ABNORMAL URINE: Primary | ICD-10-CM

## 2022-04-20 LAB
BILIRUB BLD-MCNC: NEGATIVE MG/DL
CLARITY, POC: ABNORMAL
COLOR UR: YELLOW
EXPIRATION DATE: ABNORMAL
GLUCOSE UR STRIP-MCNC: NEGATIVE MG/DL
KETONES UR QL: NEGATIVE
LEUKOCYTE EST, POC: NEGATIVE
Lab: ABNORMAL
NITRITE UR-MCNC: NEGATIVE MG/ML
PH UR: 7 [PH] (ref 5–8)
PROT UR STRIP-MCNC: NEGATIVE MG/DL
RBC # UR STRIP: ABNORMAL /UL
SP GR UR: 1.02 (ref 1–1.03)
UROBILINOGEN UR QL: NORMAL

## 2022-04-20 PROCEDURE — 81003 URINALYSIS AUTO W/O SCOPE: CPT | Performed by: FAMILY MEDICINE

## 2022-04-20 PROCEDURE — 99211 OFF/OP EST MAY X REQ PHY/QHP: CPT | Performed by: FAMILY MEDICINE

## 2022-04-21 DIAGNOSIS — E87.1 HYPONATREMIA: ICD-10-CM

## 2022-04-21 DIAGNOSIS — R31.29 OTHER MICROSCOPIC HEMATURIA: Primary | ICD-10-CM

## 2022-04-21 DIAGNOSIS — E80.6 HYPERBILIRUBINEMIA: ICD-10-CM

## 2022-05-12 DIAGNOSIS — I10 ESSENTIAL HYPERTENSION: ICD-10-CM

## 2022-05-12 RX ORDER — HYDROCHLOROTHIAZIDE 25 MG/1
25 TABLET ORAL DAILY
Qty: 90 TABLET | Refills: 3 | Status: SHIPPED | OUTPATIENT
Start: 2022-05-12

## 2022-05-12 RX ORDER — AMLODIPINE BESYLATE 5 MG/1
2.5 TABLET ORAL DAILY
Qty: 45 TABLET | Refills: 3 | Status: SHIPPED | OUTPATIENT
Start: 2022-05-12

## 2022-06-30 RX ORDER — LISINOPRIL 20 MG/1
20 TABLET ORAL DAILY
Qty: 90 TABLET | Refills: 3 | Status: SHIPPED | OUTPATIENT
Start: 2022-06-30

## 2022-07-18 DIAGNOSIS — Z12.31 SCREENING MAMMOGRAM, ENCOUNTER FOR: Primary | ICD-10-CM

## 2022-07-18 DIAGNOSIS — Z12.31 SCREENING MAMMOGRAM, ENCOUNTER FOR: ICD-10-CM

## 2022-10-03 ENCOUNTER — OFFICE VISIT (OUTPATIENT)
Dept: INTERNAL MEDICINE | Facility: CLINIC | Age: 72
End: 2022-10-03

## 2022-10-03 VITALS
SYSTOLIC BLOOD PRESSURE: 142 MMHG | DIASTOLIC BLOOD PRESSURE: 84 MMHG | TEMPERATURE: 96.8 F | BODY MASS INDEX: 38.45 KG/M2 | HEIGHT: 63 IN | HEART RATE: 84 BPM | OXYGEN SATURATION: 98 % | WEIGHT: 217 LBS

## 2022-10-03 DIAGNOSIS — E78.49 OTHER HYPERLIPIDEMIA: ICD-10-CM

## 2022-10-03 DIAGNOSIS — I10 ESSENTIAL HYPERTENSION: Primary | ICD-10-CM

## 2022-10-03 PROCEDURE — 99213 OFFICE O/P EST LOW 20 MIN: CPT | Performed by: NURSE PRACTITIONER

## 2022-10-03 PROCEDURE — 90662 IIV NO PRSV INCREASED AG IM: CPT | Performed by: NURSE PRACTITIONER

## 2022-10-03 PROCEDURE — G0008 ADMIN INFLUENZA VIRUS VAC: HCPCS | Performed by: NURSE PRACTITIONER

## 2022-10-03 NOTE — PROGRESS NOTES
Subjective     Chief Complaint:  Hypertension    HPI:  Patient presents the office today for 6-month follow-up.  She does have hypertension and is on lisinopril, Norvasc, HCTZ.  Her blood pressure in the office today is 142/84.  Patient states she monitors this occasionally at home and this usually runs lower at home, averaging 132/80.  She denies any chest pain, shortness of breath, headaches, dizziness/lightheadedness.    The patient did have a mammogram in July, which was within normal limits.  She was supposed to have a DEXA scan as well, but states she would like to wait until next year.  She is taking a calcium and vitamin D supplement.    Patient's PMR from outside medical facility reviewed and noted.    Past Medical History:   Past Medical History:   Diagnosis Date   • Hyperlipidemia    • Hypertension      Past Surgical History:No past surgical history on file.  Social History:  reports that she has never smoked. She has never used smokeless tobacco. She reports that she does not drink alcohol and does not use drugs.    Family History: family history includes Colon cancer (age of onset: 62) in her brother; Colon cancer (age of onset: 73) in her maternal grandmother; Diabetes in her father; Hypertension in her mother.      Allergies:  No Known Allergies  Medications:  Prior to Admission medications    Medication Sig Start Date End Date Taking? Authorizing Provider   amLODIPine (NORVASC) 5 MG tablet Take 0.5 tablets by mouth Daily. 5/12/22  Yes Dede Isaac APRN   atorvastatin (LIPITOR) 20 MG tablet TAKE 1 TABLET BY MOUTH  DAILY 12/29/21  Yes Venice Ordoñez APRN   hydroCHLOROthiazide (HYDRODIURIL) 25 MG tablet Take 1 tablet by mouth Daily. 5/12/22  Yes Dede Isaac APRN   lisinopril (PRINIVIL,ZESTRIL) 20 MG tablet Take 1 tablet by mouth Daily. 6/30/22  Yes Ashley Matt APRN       Objective     Vital Signs: /84 (BP Location: Left arm, Patient Position: Sitting, Cuff Size:  "Adult)   Pulse 84   Temp 96.8 °F (36 °C) (Temporal)   Ht 160 cm (63\")   Wt 98.4 kg (217 lb)   LMP  (LMP Unknown)   SpO2 98%   BMI 38.44 kg/m²   Physical Exam  Vitals and nursing note reviewed.   Constitutional:       General: She is not in acute distress.     Appearance: She is not ill-appearing or toxic-appearing.   HENT:      Head: Normocephalic and atraumatic.      Mouth/Throat:      Mouth: Mucous membranes are moist.      Pharynx: Oropharynx is clear.   Cardiovascular:      Rate and Rhythm: Normal rate and regular rhythm.      Pulses: Normal pulses.      Heart sounds: Normal heart sounds.   Pulmonary:      Effort: Pulmonary effort is normal.      Breath sounds: No wheezing, rhonchi or rales.   Abdominal:      General: Bowel sounds are normal. There is no distension.      Palpations: Abdomen is soft.      Tenderness: There is no abdominal tenderness.   Musculoskeletal:         General: Swelling (bilateral ankles) present. No tenderness. Normal range of motion.      Cervical back: Normal range of motion and neck supple. No tenderness.   Skin:     General: Skin is warm and dry.      Findings: No erythema or rash.   Neurological:      General: No focal deficit present.      Mental Status: She is alert and oriented to person, place, and time.   Psychiatric:         Mood and Affect: Mood normal.         Behavior: Behavior normal.         Thought Content: Thought content normal.         Judgment: Judgment normal.       Class 2 Severe Obesity (BMI >=35 and <=39.9). Obesity-related health conditions include the following: hypertension and dyslipidemias. Obesity is unchanged. BMI is is above average; BMI management plan is completed. We discussed portion control and increasing exercise.    Results Reviewed:  Reviewed patient's last office visit from 3/22/2022 for subsequent Medicare wellness visit.    Assessment / Plan     Assessment/Plan:  Diagnoses and all orders for this visit:    1. Essential hypertension " (Primary)  -     Comprehensive Metabolic Panel    2. Other hyperlipidemia  -     Cancel: Lipid Panel    Other orders  -     Fluzone High-Dose 65+yrs       Patient presents for a 6-month follow-up regarding hypertension management.  Her blood pressure in the office today is 142/84, she states this normally runs lower at home, averaging around 132/80.  Would not plan to make any changes to her medications at this time and will reassess in 6 months.  I would like to reassess a CMP today as her previous 3 CMP's have shown mild hyponatremia and low chloride levels, which may be secondary to HCTZ.  Her last lipid panel in March was within normal limits, so we will withhold reassessing this today and recheck at her next annual wellness visit.  She will be given flu vaccine in the office today.    Return in about 6 months (around 4/3/2023) for Annual physical. unless patient needs to be seen sooner or acute issues arise.    I have discussed the patient results/orders and and plan/recommendation with them at today's visit.      Ashley Matt, DESEAN   10/03/2022    Answers for HPI/ROS submitted by the patient on 9/27/2022  What is the primary reason for your visit?: Physical

## 2022-10-04 LAB
ALBUMIN SERPL-MCNC: 4.7 G/DL (ref 3.5–5.2)
ALBUMIN/GLOB SERPL: 1.9 G/DL
ALP SERPL-CCNC: 62 U/L (ref 39–117)
ALT SERPL-CCNC: 18 U/L (ref 1–33)
AST SERPL-CCNC: 21 U/L (ref 1–32)
BILIRUB SERPL-MCNC: 1.5 MG/DL (ref 0–1.2)
BUN SERPL-MCNC: 13 MG/DL (ref 8–23)
BUN/CREAT SERPL: 19.1 (ref 7–25)
CALCIUM SERPL-MCNC: 9.9 MG/DL (ref 8.6–10.5)
CHLORIDE SERPL-SCNC: 91 MMOL/L (ref 98–107)
CO2 SERPL-SCNC: 29.2 MMOL/L (ref 22–29)
CREAT SERPL-MCNC: 0.68 MG/DL (ref 0.57–1)
EGFRCR SERPLBLD CKD-EPI 2021: 91.5 ML/MIN/1.73
GLOBULIN SER CALC-MCNC: 2.5 GM/DL
GLUCOSE SERPL-MCNC: 96 MG/DL (ref 65–99)
POTASSIUM SERPL-SCNC: 4.2 MMOL/L (ref 3.5–5.2)
PROT SERPL-MCNC: 7.2 G/DL (ref 6–8.5)
SODIUM SERPL-SCNC: 133 MMOL/L (ref 136–145)

## 2022-10-04 NOTE — PROGRESS NOTES
Labs show slightly low sodium and chloride which may indicate mild dehydration, and may be side effect from home medication hydrochlorothiazide.  I would like for her to continue taking hydrochlorothiazide for her blood pressure and we can recheck this at her annual exam in 6 months, however I would like for her to increase her water intake to at least 64 ounces daily to compensate diuretic effect of hydrochlorothiazide.

## 2022-10-28 ENCOUNTER — TELEPHONE (OUTPATIENT)
Dept: INTERNAL MEDICINE | Facility: CLINIC | Age: 72
End: 2022-10-28

## 2022-10-28 NOTE — TELEPHONE ENCOUNTER
Niece (Serenity [phone]) from [place] called because she is wanting some Home Health or something to that matter for her grandmother and aunt as they have covid.    Janny and Serenity tested positive this morning and are extremely weak and unable to function.  She states she is too weak to and is unable to access bathroom, food and water.  She is in [place] or she would handle it herself.  She does not believe this constitutes calling [phone]

## 2022-12-02 DIAGNOSIS — E78.49 OTHER HYPERLIPIDEMIA: Primary | ICD-10-CM

## 2022-12-02 RX ORDER — ATORVASTATIN CALCIUM 20 MG/1
20 TABLET, FILM COATED ORAL DAILY
Qty: 90 TABLET | Refills: 3 | Status: SHIPPED | OUTPATIENT
Start: 2022-12-02

## 2023-04-03 ENCOUNTER — LAB (OUTPATIENT)
Dept: INTERNAL MEDICINE | Facility: CLINIC | Age: 73
End: 2023-04-03
Payer: MEDICARE

## 2023-04-03 DIAGNOSIS — D50.9 IRON DEFICIENCY ANEMIA, UNSPECIFIED IRON DEFICIENCY ANEMIA TYPE: ICD-10-CM

## 2023-04-03 DIAGNOSIS — I10 ESSENTIAL HYPERTENSION: Primary | ICD-10-CM

## 2023-04-03 DIAGNOSIS — E78.49 OTHER HYPERLIPIDEMIA: ICD-10-CM

## 2023-04-04 LAB
ALBUMIN SERPL-MCNC: 4.7 G/DL (ref 3.5–5.2)
ALBUMIN/GLOB SERPL: 1.8 G/DL
ALP SERPL-CCNC: 56 U/L (ref 39–117)
ALT SERPL-CCNC: 22 U/L (ref 1–33)
APPEARANCE UR: CLEAR
AST SERPL-CCNC: 18 U/L (ref 1–32)
BACTERIA #/AREA URNS HPF: ABNORMAL /HPF
BASOPHILS # BLD AUTO: 0.06 10*3/MM3 (ref 0–0.2)
BASOPHILS NFR BLD AUTO: 1.2 % (ref 0–1.5)
BILIRUB SERPL-MCNC: 1.7 MG/DL (ref 0–1.2)
BILIRUB UR QL STRIP: NEGATIVE
BUN SERPL-MCNC: 14 MG/DL (ref 8–23)
BUN/CREAT SERPL: 17.7 (ref 7–25)
CALCIUM SERPL-MCNC: 10.1 MG/DL (ref 8.6–10.5)
CASTS URNS MICRO: ABNORMAL
CHLORIDE SERPL-SCNC: 96 MMOL/L (ref 98–107)
CHOLEST SERPL-MCNC: 126 MG/DL (ref 0–200)
CO2 SERPL-SCNC: 29.2 MMOL/L (ref 22–29)
COLOR UR: YELLOW
CREAT SERPL-MCNC: 0.79 MG/DL (ref 0.57–1)
EGFRCR SERPLBLD CKD-EPI 2021: 78.6 ML/MIN/1.73
EOSINOPHIL # BLD AUTO: 0.11 10*3/MM3 (ref 0–0.4)
EOSINOPHIL NFR BLD AUTO: 2.2 % (ref 0.3–6.2)
EPI CELLS #/AREA URNS HPF: ABNORMAL /HPF
ERYTHROCYTE [DISTWIDTH] IN BLOOD BY AUTOMATED COUNT: 11.9 % (ref 12.3–15.4)
GLOBULIN SER CALC-MCNC: 2.6 GM/DL
GLUCOSE SERPL-MCNC: 110 MG/DL (ref 65–99)
GLUCOSE UR QL STRIP: NEGATIVE
HCT VFR BLD AUTO: 40.9 % (ref 34–46.6)
HDLC SERPL-MCNC: 49 MG/DL (ref 40–60)
HGB BLD-MCNC: 13.5 G/DL (ref 12–15.9)
HGB UR QL STRIP: ABNORMAL
IMM GRANULOCYTES # BLD AUTO: 0.01 10*3/MM3 (ref 0–0.05)
IMM GRANULOCYTES NFR BLD AUTO: 0.2 % (ref 0–0.5)
KETONES UR QL STRIP: NEGATIVE
LDLC SERPL CALC-MCNC: 56 MG/DL (ref 0–100)
LEUKOCYTE ESTERASE UR QL STRIP: NEGATIVE
LYMPHOCYTES # BLD AUTO: 1.86 10*3/MM3 (ref 0.7–3.1)
LYMPHOCYTES NFR BLD AUTO: 36.8 % (ref 19.6–45.3)
MCH RBC QN AUTO: 27.3 PG (ref 26.6–33)
MCHC RBC AUTO-ENTMCNC: 33 G/DL (ref 31.5–35.7)
MCV RBC AUTO: 82.8 FL (ref 79–97)
MONOCYTES # BLD AUTO: 0.27 10*3/MM3 (ref 0.1–0.9)
MONOCYTES NFR BLD AUTO: 5.3 % (ref 5–12)
NEUTROPHILS # BLD AUTO: 2.74 10*3/MM3 (ref 1.7–7)
NEUTROPHILS NFR BLD AUTO: 54.3 % (ref 42.7–76)
NITRITE UR QL STRIP: NEGATIVE
NRBC BLD AUTO-RTO: 0 /100 WBC (ref 0–0.2)
PH UR STRIP: 6.5 [PH] (ref 5–8)
PLATELET # BLD AUTO: 303 10*3/MM3 (ref 140–450)
POTASSIUM SERPL-SCNC: 4.8 MMOL/L (ref 3.5–5.2)
PROT SERPL-MCNC: 7.3 G/DL (ref 6–8.5)
PROT UR QL STRIP: NEGATIVE
RBC # BLD AUTO: 4.94 10*6/MM3 (ref 3.77–5.28)
RBC #/AREA URNS HPF: ABNORMAL /HPF
SODIUM SERPL-SCNC: 136 MMOL/L (ref 136–145)
SP GR UR STRIP: 1.02 (ref 1–1.03)
TRIGL SERPL-MCNC: 115 MG/DL (ref 0–150)
TSH SERPL DL<=0.005 MIU/L-ACNC: 1.39 UIU/ML (ref 0.27–4.2)
UROBILINOGEN UR STRIP-MCNC: ABNORMAL MG/DL
VLDLC SERPL CALC-MCNC: 21 MG/DL (ref 5–40)
WBC # BLD AUTO: 5.05 10*3/MM3 (ref 3.4–10.8)
WBC #/AREA URNS HPF: ABNORMAL /HPF

## 2023-04-11 ENCOUNTER — OFFICE VISIT (OUTPATIENT)
Dept: INTERNAL MEDICINE | Facility: CLINIC | Age: 73
End: 2023-04-11
Payer: MEDICARE

## 2023-04-11 VITALS
RESPIRATION RATE: 18 BRPM | SYSTOLIC BLOOD PRESSURE: 150 MMHG | OXYGEN SATURATION: 98 % | TEMPERATURE: 98 F | DIASTOLIC BLOOD PRESSURE: 98 MMHG | WEIGHT: 219 LBS | HEIGHT: 63 IN | HEART RATE: 76 BPM | BODY MASS INDEX: 38.8 KG/M2

## 2023-04-11 DIAGNOSIS — Z12.31 ENCOUNTER FOR SCREENING MAMMOGRAM FOR BREAST CANCER: ICD-10-CM

## 2023-04-11 DIAGNOSIS — I10 ESSENTIAL HYPERTENSION: Primary | ICD-10-CM

## 2023-04-11 DIAGNOSIS — E78.2 MIXED HYPERLIPIDEMIA: ICD-10-CM

## 2023-04-11 DIAGNOSIS — Z78.0 POSTMENOPAUSAL: ICD-10-CM

## 2023-04-11 DIAGNOSIS — H61.22 LEFT EAR IMPACTED CERUMEN: ICD-10-CM

## 2023-04-11 RX ORDER — LISINOPRIL 40 MG/1
40 TABLET ORAL DAILY
Qty: 90 TABLET | Refills: 3 | Status: SHIPPED | OUTPATIENT
Start: 2023-04-11

## 2023-04-11 NOTE — PROGRESS NOTES
The ABCs of the Annual Wellness Visit  Subsequent Medicare Wellness Visit    Subjective      Serenity Hung is a 72 y.o. female who presents for a Subsequent Medicare Wellness Visit.    Patient presents today for subsequent Medicare wellness visit.  She was last seen by DESEAN Campo on 10/3/2022.  She has a past medical history significant for hypertension and hyperlipidemia.  She currently takes amlodipine 5 mg, hydrochlorothiazide 25 mg, and lisinopril 20 mg daily.  She also takes atorvastatin 20 mg daily but lipid panel has been within normal limits over the last several years.    Blood pressure is elevated this morning at 150/98.  She reports that she checked it before her appointment and it was 150/81.  She did take an extra tablet of lisinopril after her high reading this morning.  However, she states that she does not usually do this.  She does report that her systolic blood pressure has been running consistently over 140 at home.  She feels that it is time for her blood pressure medication to be increased.    Fasting labs were obtained on 4/3/2023.  CMP, CBC, lipid panel, and TSH were unremarkable.  Urinalysis showed trace blood which is not abnormal for her.  She is concerned about her fasting glucose being high.  She states that she needs to change her diet.    She will be due for mammogram after 7/18.  She is also due for DEXA bone density scan and second dose of zoster.    The following portions of the patient's history were reviewed and   updated as appropriate: allergies, current medications, past family history, past medical history, past social history, past surgical history and problem list.    Compared to one year ago, the patient feels her physical   health is the same.    Compared to one year ago, the patient feels her mental   health is the same.    Recent Hospitalizations:  She was not admitted to the hospital during the last year.     Current Medical Providers:  Patient Care Team:  Vernell  "DESEAN Nixon as PCP - General (Nurse Practitioner)    Outpatient Medications Prior to Visit   Medication Sig Dispense Refill   • amLODIPine (NORVASC) 5 MG tablet Take 0.5 tablets by mouth Daily. 45 tablet 3   • atorvastatin (LIPITOR) 20 MG tablet Take 1 tablet by mouth Daily. 90 tablet 3   • hydroCHLOROthiazide (HYDRODIURIL) 25 MG tablet Take 1 tablet by mouth Daily. 90 tablet 3   • lisinopril (PRINIVIL,ZESTRIL) 20 MG tablet Take 1 tablet by mouth Daily. 90 tablet 3     No facility-administered medications prior to visit.       No opioid medication identified on active medication list. I have reviewed chart for other potential  high risk medication/s and harmful drug interactions in the elderly.          Aspirin is not on active medication list.  Aspirin use is not indicated based on review of current medical condition/s. Risk of harm outweighs potential benefits.  .    Patient Active Problem List   Diagnosis   • Essential hypertension   • Morbidly obese   • At risk for falls   • Mixed hyperlipidemia   • Osteoporosis, postmenopausal     Advance Care Planning   Advance Care Planning     Advance Directive is not on file.  ACP discussion was held with the patient during this visit. Patient does not have an advance directive, declines further assistance.     Objective    Vitals:    04/11/23 0915   BP: 150/98   BP Location: Left arm   Patient Position: Sitting   Cuff Size: Adult   Pulse: 76   Resp: 18   Temp: 98 °F (36.7 °C)   TempSrc: Infrared   SpO2: 98%   Weight: 99.3 kg (219 lb)   Height: 160 cm (63\")   PainSc: 0-No pain     Physical Exam  Vitals and nursing note reviewed.   Constitutional:       General: She is not in acute distress.     Appearance: Normal appearance. She is obese.   HENT:      Head: Normocephalic.      Right Ear: Tympanic membrane normal.      Left Ear: There is impacted cerumen.   Eyes:      Pupils: Pupils are equal, round, and reactive to light.   Cardiovascular:      Rate and Rhythm: Normal " "rate and regular rhythm.      Pulses: Normal pulses.      Heart sounds: Normal heart sounds. No murmur heard.  Pulmonary:      Effort: Pulmonary effort is normal. No respiratory distress.      Breath sounds: Normal breath sounds. No wheezing.   Abdominal:      General: Bowel sounds are normal. There is no distension.      Palpations: Abdomen is soft.      Tenderness: There is no abdominal tenderness.   Musculoskeletal:         General: No swelling or tenderness. Normal range of motion.      Cervical back: Normal range of motion.   Lymphadenopathy:      Cervical: No cervical adenopathy.   Skin:     General: Skin is warm and dry.      Capillary Refill: Capillary refill takes less than 2 seconds.      Findings: No bruising, erythema or rash.   Neurological:      Mental Status: She is alert and oriented to person, place, and time. Mental status is at baseline.      Motor: No weakness.       Estimated body mass index is 38.79 kg/m² as calculated from the following:    Height as of this encounter: 160 cm (63\").    Weight as of this encounter: 99.3 kg (219 lb).    Class 2 Severe Obesity (BMI >=35 and <=39.9). Obesity-related health conditions include the following: hypertension. Obesity is unchanged. BMI is is above average; BMI management plan is completed. We discussed portion control and increasing exercise.    Does the patient have evidence of cognitive impairment?   No    Lab Results   Component Value Date    CHLPL 126 2023    TRIG 115 2023    HDL 49 2023    LDL 56 2023    VLDL 21 2023        HEALTH RISK ASSESSMENT    Smoking Status:  Social History     Tobacco Use   Smoking Status Never   Smokeless Tobacco Never     Alcohol Consumption:  Social History     Substance and Sexual Activity   Alcohol Use Never     Fall Risk Screen:    STEADI Fall Risk Assessment was completed, and patient is at LOW risk for falls.Assessment completed on:2023    Depression Screenin/11/2023     " 9:22 AM   PHQ-2/PHQ-9 Depression Screening   Little Interest or Pleasure in Doing Things 0-->not at all   Feeling Down, Depressed or Hopeless 0-->not at all   PHQ-9: Brief Depression Severity Measure Score 0       Health Habits and Functional and Cognitive Screenin/11/2023     9:20 AM   Functional & Cognitive Status   Do you have difficulty preparing food and eating? No   Do you have difficulty bathing yourself, getting dressed or grooming yourself? No   Do you have difficulty using the toilet? No   Do you have difficulty moving around from place to place? No   Do you have trouble with steps or getting out of a bed or a chair? No   Current Diet Unhealthy Diet   Dental Exam Up to date   Eye Exam Up to date   Exercise (times per week) 2 times per week   Current Exercises Include Walking   Do you need help using the phone?  No   Are you deaf or do you have serious difficulty hearing?  No   Do you need help with transportation? No   Do you need help shopping? No   Do you need help preparing meals?  No   Do you need help with housework?  No   Do you need help with laundry? No   Do you need help taking your medications? No   Do you need help managing money? No   Do you ever drive or ride in a car without wearing a seat belt? No   Have you felt unusual stress, anger or loneliness in the last month? Yes   Who do you live with? Other   If you need help, do you have trouble finding someone available to you? No   Have you been bothered in the last four weeks by sexual problems? No   Do you have difficulty concentrating, remembering or making decisions? No       Age-appropriate Screening Schedule:  Refer to the list below for future screening recommendations based on patient's age, sex and/or medical conditions. Orders for these recommended tests are listed in the plan section. The patient has been provided with a written plan.    Health Maintenance   Topic Date Due   • COVID-19 Vaccine (3 - Booster for Moderna series)  05/18/2021   • DXA SCAN  07/31/2022   • ZOSTER VACCINE (1 of 2) 04/11/2024 (Originally 7/22/1998)   • INFLUENZA VACCINE  08/01/2023   • LIPID PANEL  04/03/2024   • ANNUAL WELLNESS VISIT  04/11/2024   • MAMMOGRAM  07/18/2024   • COLORECTAL CANCER SCREENING  11/30/2025   • TDAP/TD VACCINES (2 - Td or Tdap) 06/07/2031   • Pneumococcal Vaccine 65+  Completed   • HEPATITIS C SCREENING  Discontinued                  CMS Preventative Services Quick Reference  Risk Factors Identified During Encounter:    Immunizations Discussed/Encouraged: Shingrix  Inactivity/Sedentary: Patient was advised to exercise at least 150 minutes a week per CDC recommendations.    The above risks/problems have been discussed with the patient.  Pertinent information has been shared with the patient in the After Visit Summary.    Diagnoses and all orders for this visit:    1. Essential hypertension (Primary)  -     lisinopril (PRINIVIL,ZESTRIL) 40 MG tablet; Take 1 tablet by mouth Daily.  Dispense: 90 tablet; Refill: 3    2. Mixed hyperlipidemia    3. Postmenopausal  -     Cancel: DEXA Bone Density Axial; Future  -     DEXA Bone Density Axial; Future    4. Encounter for screening mammogram for breast cancer  -     Cancel: Mammo Screening Digital Tomosynthesis Bilateral With CAD; Future  -     Mammo Screening Digital Tomosynthesis Bilateral With CAD; Future    5. Left ear impacted cerumen  -     Ear Cerumen Removal      Will increase lisinopril to 40 mg daily.  If her systolic blood pressure remains consistently over 140, she will call the office.  She plans to return to the office for blood pressure check since we are increasing the dose of lisinopril.  She was encouraged to bring in her home blood pressure cuff when she comes in for blood pressure check to compare readings.  If her blood pressure remains elevated, would consider increasing amlodipine to 5 mg daily.    She is concerned about her fasting glucose being elevated at 110.  We discussed  that limiting intake of sugary and processed foods would benefit her.  She states that she is going to try to eat more fresh fruits and vegetables.    She will be due for mammogram after 7/18.  She is also due for DEXA bone density scan.  Will place order for the both of these to be completed at [facility] per patient request.  She was advised to go to a pharmacy to receive her second dose of zoster vaccine.    She will follow-up in 6 months.  However, she understands that she will need to call if her blood pressure remains elevated despite increasing dose of lisinopril.    Cerumen Removal    Date/Time: 4/11/2023 10:09 AM  Performed by: Kacey Barnett MA  Authorized by: Jennifer Masters APRN     Anesthesia:  Local Anesthetic: none  Location details: left ear  Patient tolerance: patient tolerated the procedure well with no immediate complications  Procedure type: irrigation   Sedation:  Patient sedated: no          Follow Up:   Next Medicare Wellness visit to be scheduled in 1 year.      An After Visit Summary and PPPS were made available to the patient.

## 2023-04-13 DIAGNOSIS — I10 ESSENTIAL HYPERTENSION: ICD-10-CM

## 2023-04-14 RX ORDER — HYDROCHLOROTHIAZIDE 25 MG/1
25 TABLET ORAL DAILY
Qty: 90 TABLET | Refills: 3 | Status: SHIPPED | OUTPATIENT
Start: 2023-04-14

## 2023-04-14 RX ORDER — AMLODIPINE BESYLATE 5 MG/1
TABLET ORAL
Qty: 45 TABLET | Refills: 3 | Status: SHIPPED | OUTPATIENT
Start: 2023-04-14

## 2023-07-31 DIAGNOSIS — Z12.31 ENCOUNTER FOR SCREENING MAMMOGRAM FOR BREAST CANCER: ICD-10-CM

## 2023-07-31 DIAGNOSIS — Z78.0 POSTMENOPAUSAL: ICD-10-CM

## 2023-08-04 DIAGNOSIS — M81.0 AGE-RELATED OSTEOPOROSIS WITHOUT CURRENT PATHOLOGICAL FRACTURE: Primary | ICD-10-CM

## 2023-10-17 ENCOUNTER — OFFICE VISIT (OUTPATIENT)
Dept: INTERNAL MEDICINE | Facility: CLINIC | Age: 73
End: 2023-10-17
Payer: MEDICARE

## 2023-10-17 VITALS
SYSTOLIC BLOOD PRESSURE: 144 MMHG | BODY MASS INDEX: 38.8 KG/M2 | HEIGHT: 63 IN | HEART RATE: 76 BPM | WEIGHT: 219 LBS | TEMPERATURE: 97.8 F | DIASTOLIC BLOOD PRESSURE: 92 MMHG | RESPIRATION RATE: 18 BRPM | OXYGEN SATURATION: 97 %

## 2023-10-17 DIAGNOSIS — Z23 FLU VACCINE NEED: ICD-10-CM

## 2023-10-17 DIAGNOSIS — M81.0 OSTEOPOROSIS, POSTMENOPAUSAL: ICD-10-CM

## 2023-10-17 DIAGNOSIS — I10 ESSENTIAL HYPERTENSION: Primary | ICD-10-CM

## 2023-10-17 PROCEDURE — 1159F MED LIST DOCD IN RCRD: CPT

## 2023-10-17 PROCEDURE — 90662 IIV NO PRSV INCREASED AG IM: CPT

## 2023-10-17 PROCEDURE — 99213 OFFICE O/P EST LOW 20 MIN: CPT

## 2023-10-17 PROCEDURE — 3080F DIAST BP >= 90 MM HG: CPT

## 2023-10-17 PROCEDURE — G0008 ADMIN INFLUENZA VIRUS VAC: HCPCS

## 2023-10-17 PROCEDURE — 3077F SYST BP >= 140 MM HG: CPT

## 2023-10-17 PROCEDURE — 1160F RVW MEDS BY RX/DR IN RCRD: CPT

## 2023-10-17 NOTE — PROGRESS NOTES
Subjective     Chief Complaint:  Follow up hypertension    HPI:  Patient presents today for 6-month follow-up on hypertension.  She was last seen on 4/11/2023 for Medicare wellness visit.  At that time the patient reported that her systolic blood pressure had been consistently running over 140 at home.  Lisinopril dose was increased to 40 mg daily.  She states that her blood pressure has been running better at home.  She reports that it is usually around 134/80.    Mammogram was obtained in July which was normal.  She will need to repeat in 1 year.  Bone density scan was also obtained in July which showed severe osteoporosis.  She was referred to the osteoporosis specialist at [facility].  However, she states that she did not go to this appointment because she knows that they would try to put her on medications and she does not want to take those anyway.  She states that she has done plenty of research on DEXA scans and treatment of osteoporosis and she has opted to not proceed with treatment.    Past Medical History:   Past Medical History:   Diagnosis Date    Hyperlipidemia     Hypertension      Past Surgical History:History reviewed. No pertinent surgical history.    Allergies:  No Known Allergies  Medications:  Prior to Admission medications    Medication Sig Start Date End Date Taking? Authorizing Provider   amLODIPine (NORVASC) 5 MG tablet TAKE ONE-HALF TABLET BY  MOUTH DAILY 4/14/23   Dede Isaac APRN   atorvastatin (LIPITOR) 20 MG tablet Take 1 tablet by mouth Daily. 12/2/22   Ashley Matt APRN   hydroCHLOROthiazide (HYDRODIURIL) 25 MG tablet TAKE 1 TABLET BY MOUTH  DAILY 4/14/23   Dede Isaac APRN   lisinopril (PRINIVIL,ZESTRIL) 40 MG tablet Take 1 tablet by mouth Daily. 4/11/23   Jennifer Masters APRN       Objective     Vital Signs: /92 (BP Location: Left arm, Patient Position: Sitting, Cuff Size: Large Adult)   Pulse 76   Temp 97.8 °F (36.6 °C) (Infrared)   Resp 18    "Ht 160 cm (63\")   Wt 99.3 kg (219 lb)   LMP  (LMP Unknown)   SpO2 97%   BMI 38.79 kg/m²   Physical Exam  Vitals and nursing note reviewed.   Constitutional:       General: She is not in acute distress.     Appearance: Normal appearance. She is obese.   HENT:      Right Ear: There is impacted cerumen.      Left Ear: Tympanic membrane normal.   Cardiovascular:      Rate and Rhythm: Normal rate and regular rhythm.      Pulses: Normal pulses.      Heart sounds: Normal heart sounds. No murmur heard.  Pulmonary:      Effort: Pulmonary effort is normal. No respiratory distress.      Breath sounds: Normal breath sounds. No wheezing.   Abdominal:      General: Bowel sounds are normal. There is no distension.      Palpations: Abdomen is soft.      Tenderness: There is no abdominal tenderness.   Musculoskeletal:         General: Normal range of motion.      Cervical back: Normal range of motion.   Lymphadenopathy:      Cervical: No cervical adenopathy.   Skin:     General: Skin is warm and dry.      Capillary Refill: Capillary refill takes less than 2 seconds.      Findings: No bruising, erythema or rash.   Neurological:      Mental Status: She is alert and oriented to person, place, and time. Mental status is at baseline.      Motor: No weakness.       Results Reviewed:  Reviewed recent bone density scan and mammogram results.  Reviewed most recent CMP which was obtained on 4/3/2021.    Assessment / Plan     Assessment/Plan:  Diagnoses and all orders for this visit:    1. Essential hypertension (Primary)  -     Cancel: Basic metabolic panel    2. Osteoporosis, postmenopausal    3. Flu vaccine need  -     Cancel: Basic metabolic panel  -     Fluzone High-Dose 65+yrs       Patient wishes to hold off on labs today.  We discussed that ideally we would need to check renal function since she is on lisinopril and hydrochlorothiazide.  After discussion with the patient, she only wants to have labs obtained yearly.  Will continue " lisinopril 40 mg, hydrochlorothiazide 25 mg, and amlodipine 2.5 mg daily.    She understands the risk associated with not treating her osteoporosis.  She is willing to accept that risk and does not wish to be treated for it.    She received her flu vaccine today.  She has also received her zoster vaccine through her pharmacy.    We will need to place order for mammogram at her Medicare wellness visit in 6 months.    Return in about 6 months (around 4/17/2024) for Medicare Wellness. unless patient needs to be seen sooner or acute issues arise.    I have discussed the patient results/orders and and plan/recommendation with them at today's visit.      Jennifer Masters, APRN   10/17/2023

## 2023-10-25 DIAGNOSIS — E78.49 OTHER HYPERLIPIDEMIA: ICD-10-CM

## 2023-10-25 RX ORDER — ATORVASTATIN CALCIUM 20 MG/1
20 TABLET, FILM COATED ORAL DAILY
Qty: 90 TABLET | Refills: 3 | Status: SHIPPED | OUTPATIENT
Start: 2023-10-25

## 2024-02-03 DIAGNOSIS — I10 ESSENTIAL HYPERTENSION: ICD-10-CM

## 2024-02-03 DIAGNOSIS — E78.49 OTHER HYPERLIPIDEMIA: ICD-10-CM

## 2024-02-05 RX ORDER — HYDROCHLOROTHIAZIDE 25 MG/1
25 TABLET ORAL DAILY
Qty: 90 TABLET | Refills: 3 | Status: SHIPPED | OUTPATIENT
Start: 2024-02-05

## 2024-02-05 RX ORDER — LISINOPRIL 40 MG/1
40 TABLET ORAL DAILY
Qty: 90 TABLET | Refills: 3 | Status: SHIPPED | OUTPATIENT
Start: 2024-02-05

## 2024-02-05 RX ORDER — AMLODIPINE BESYLATE 5 MG/1
2.5 TABLET ORAL DAILY
Qty: 45 TABLET | Refills: 3 | Status: SHIPPED | OUTPATIENT
Start: 2024-02-05

## 2024-04-16 ENCOUNTER — OFFICE VISIT (OUTPATIENT)
Dept: INTERNAL MEDICINE | Facility: CLINIC | Age: 74
End: 2024-04-16
Payer: MEDICARE

## 2024-04-16 VITALS
TEMPERATURE: 97.8 F | BODY MASS INDEX: 37.39 KG/M2 | OXYGEN SATURATION: 98 % | SYSTOLIC BLOOD PRESSURE: 132 MMHG | WEIGHT: 211 LBS | HEART RATE: 77 BPM | HEIGHT: 63 IN | DIASTOLIC BLOOD PRESSURE: 84 MMHG

## 2024-04-16 DIAGNOSIS — I10 ESSENTIAL HYPERTENSION: ICD-10-CM

## 2024-04-16 DIAGNOSIS — E78.2 MIXED HYPERLIPIDEMIA: ICD-10-CM

## 2024-04-16 DIAGNOSIS — E55.9 VITAMIN D DEFICIENCY: ICD-10-CM

## 2024-04-16 DIAGNOSIS — Z79.899 ENCOUNTER FOR LONG-TERM (CURRENT) USE OF MEDICATIONS: ICD-10-CM

## 2024-04-16 DIAGNOSIS — Z12.31 SCREENING MAMMOGRAM, ENCOUNTER FOR: ICD-10-CM

## 2024-04-16 PROCEDURE — 1170F FXNL STATUS ASSESSED: CPT

## 2024-04-16 PROCEDURE — 1159F MED LIST DOCD IN RCRD: CPT

## 2024-04-16 PROCEDURE — G0439 PPPS, SUBSEQ VISIT: HCPCS

## 2024-04-16 PROCEDURE — 3079F DIAST BP 80-89 MM HG: CPT

## 2024-04-16 PROCEDURE — 1160F RVW MEDS BY RX/DR IN RCRD: CPT

## 2024-04-16 PROCEDURE — 3075F SYST BP GE 130 - 139MM HG: CPT

## 2024-04-16 NOTE — PROGRESS NOTES
The ABCs of the Annual Wellness Visit  Subsequent Medicare Wellness Visit    Subjective    Serenity Hung is a 73 y.o. female who presents for a Subsequent Medicare Wellness Visit.    The following portions of the patient's history were reviewed and   updated as appropriate: allergies, current medications, past family history, past medical history, past social history, past surgical history, and problem list.    Compared to one year ago, the patient feels her physical   health is the same.    Compared to one year ago, the patient feels her mental   health is the same.    Recent Hospitalizations:  She was not admitted to the hospital during the last year.     Current Medical Providers:  Patient Care Team:  Jennifer Masters APRN as PCP - General (Internal Medicine)    Outpatient Medications Prior to Visit   Medication Sig Dispense Refill    amLODIPine (NORVASC) 5 MG tablet Take 0.5 tablets by mouth Daily. 45 tablet 3    atorvastatin (LIPITOR) 20 MG tablet TAKE 1 TABLET BY MOUTH DAILY 90 tablet 3    hydroCHLOROthiazide (HYDRODIURIL) 25 MG tablet Take 1 tablet by mouth Daily. 90 tablet 3    lisinopril (PRINIVIL,ZESTRIL) 40 MG tablet TAKE 1 TABLET BY MOUTH DAILY 90 tablet 3     No facility-administered medications prior to visit.       No opioid medication identified on active medication list. I have reviewed chart for other potential  high risk medication/s and harmful drug interactions in the elderly.        Aspirin is not on active medication list.  Aspirin use is not indicated based on review of current medical condition/s. Risk of harm outweighs potential benefits.  .    Patient Active Problem List   Diagnosis    Essential hypertension    Morbidly obese    At risk for falls    Mixed hyperlipidemia    Osteoporosis, postmenopausal     Advance Care Planning   Advance Care Planning     Advance Directive is not on file.  ACP discussion was held with the patient during this visit. Patient does not have an advance  "directive, declines further assistance.     Objective    There were no vitals filed for this visit.  Estimated body mass index is 38.79 kg/m² as calculated from the following:    Height as of 10/17/23: 160 cm (63\").    Weight as of 10/17/23: 99.3 kg (219 lb).    Class 2 Severe Obesity (BMI >=35 and <=39.9). Obesity-related health conditions include the following: hypertension. Obesity is improving with lifestyle modifications. BMI is is above average; BMI management plan is completed. We discussed portion control and increasing exercise.      Does the patient have evidence of cognitive impairment? No          HEALTH RISK ASSESSMENT    Smoking Status:  Social History     Tobacco Use   Smoking Status Never   Smokeless Tobacco Never     Alcohol Consumption:  Social History     Substance and Sexual Activity   Alcohol Use Never     Fall Risk Screen:    ELIZABETH Fall Risk Assessment was completed, and patient is at LOW risk for falls.Assessment completed on:2024    Depression Screenin/16/2024     8:05 AM   PHQ-2/PHQ-9 Depression Screening   Little Interest or Pleasure in Doing Things 0-->not at all   Feeling Down, Depressed or Hopeless 0-->not at all   PHQ-9: Brief Depression Severity Measure Score 0       Health Habits and Functional and Cognitive Screenin/16/2024     8:05 AM   Functional & Cognitive Status   Do you have difficulty preparing food and eating? No   Do you have difficulty bathing yourself, getting dressed or grooming yourself? No   Do you have difficulty using the toilet? No   Do you have difficulty moving around from place to place? No   Do you have trouble with steps or getting out of a bed or a chair? No   Current Diet Well Balanced Diet   Dental Exam Up to date   Eye Exam Up to date   Exercise (times per week) 6 times per week   Current Exercises Include Walking   Do you need help using the phone?  No   Are you deaf or do you have serious difficulty hearing?  No   Do you need help " to go to places out of walking distance? No   Do you need help shopping? No   Do you need help preparing meals?  No   Do you need help with housework?  No   Do you need help with laundry? No   Do you need help taking your medications? No   Do you need help managing money? No   Do you ever drive or ride in a car without wearing a seat belt? No   Have you felt unusual stress, anger or loneliness in the last month? No   Who do you live with? Alone   If you need help, do you have trouble finding someone available to you? No   Have you been bothered in the last four weeks by sexual problems? No   Do you have difficulty concentrating, remembering or making decisions? No       Age-appropriate Screening Schedule:  Refer to the list below for future screening recommendations based on patient's age, sex and/or medical conditions. Orders for these recommended tests are listed in the plan section. The patient has been provided with a written plan.    Health Maintenance   Topic Date Due    RSV Vaccine - Adults (1 - 1-dose 60+ series) Never done    COVID-19 Vaccine (5 - 2023-24 season) 09/01/2023    LIPID PANEL  04/03/2024    BMI FOLLOWUP  04/11/2024    INFLUENZA VACCINE  08/01/2024    ANNUAL WELLNESS VISIT  04/16/2025    DXA SCAN  07/21/2025    COLORECTAL CANCER SCREENING  11/30/2025    TDAP/TD VACCINES (2 - Td or Tdap) 06/07/2031    Pneumococcal Vaccine 65+  Completed    ZOSTER VACCINE  Completed    HEPATITIS C SCREENING  Discontinued                  CMS Preventative Services Quick Reference  Risk Factors Identified During Encounter  Dental Screening Recommended  Vision Screening Recommended  The above risks/problems have been discussed with the patient.  Pertinent information has been shared with the patient in the After Visit Summary.  An After Visit Summary and PPPS were made available to the patient.    Blood pressure today is 132/84.  She continues taking lisinopril 40 mg daily, amlodipine 2.5 mg daily, and  hydrochlorothiazide 25 mg daily.  Continue with current regimen.    She has lost about 8 pounds over the last 6 months as result of making lifestyle changes.  After her mother passed that she plans to get a treadmill so she can become more physically active.  She reports that she is generally feeling better since she has made diet modifications.  Congratulated her on this and encouraged her to continue with her efforts.    Patient will be due for mammogram in July.  Order has been placed.  She prefers to have this completed at [facility].    Patient has declined further DEXA scans as she would not want to proceed with treatment anyway.  She is trying to incorporate more weightbearing exercise.    She is due for labs today.  She is fasting and will have these drawn today.    Patient is caring for her elderly mother who is currently at [facility] for rehab.  She anticipates needing palliative care/hospice in the near future.  Encouraged her to reach out if we can assist either of them with this.    Follow Up:   Next Medicare Wellness visit to be scheduled in 1 year.

## 2024-04-17 LAB
25(OH)D3+25(OH)D2 SERPL-MCNC: 28 NG/ML (ref 30–100)
ALBUMIN SERPL-MCNC: 4.6 G/DL (ref 3.5–5.2)
ALBUMIN/GLOB SERPL: 1.9 G/DL
ALP SERPL-CCNC: 81 U/L (ref 39–117)
ALT SERPL-CCNC: 15 U/L (ref 1–33)
APPEARANCE UR: CLEAR
AST SERPL-CCNC: 15 U/L (ref 1–32)
BACTERIA #/AREA URNS HPF: NORMAL /HPF
BASOPHILS # BLD AUTO: 0.07 10*3/MM3 (ref 0–0.2)
BASOPHILS NFR BLD AUTO: 1.3 % (ref 0–1.5)
BILIRUB SERPL-MCNC: 1.6 MG/DL (ref 0–1.2)
BILIRUB UR QL STRIP: NEGATIVE
BUN SERPL-MCNC: 17 MG/DL (ref 8–23)
BUN/CREAT SERPL: 21.3 (ref 7–25)
CALCIUM SERPL-MCNC: 9.5 MG/DL (ref 8.6–10.5)
CASTS URNS MICRO: NORMAL
CHLORIDE SERPL-SCNC: 95 MMOL/L (ref 98–107)
CHOLEST SERPL-MCNC: 121 MG/DL (ref 0–200)
CO2 SERPL-SCNC: 29 MMOL/L (ref 22–29)
COLOR UR: YELLOW
CREAT SERPL-MCNC: 0.8 MG/DL (ref 0.57–1)
EGFRCR SERPLBLD CKD-EPI 2021: 76.9 ML/MIN/1.73
EOSINOPHIL # BLD AUTO: 0.17 10*3/MM3 (ref 0–0.4)
EOSINOPHIL NFR BLD AUTO: 3.2 % (ref 0.3–6.2)
EPI CELLS #/AREA URNS HPF: NORMAL /HPF
ERYTHROCYTE [DISTWIDTH] IN BLOOD BY AUTOMATED COUNT: 12.5 % (ref 12.3–15.4)
GLOBULIN SER CALC-MCNC: 2.4 GM/DL
GLUCOSE SERPL-MCNC: 100 MG/DL (ref 65–99)
GLUCOSE UR QL STRIP: NEGATIVE
HCT VFR BLD AUTO: 41.3 % (ref 34–46.6)
HDLC SERPL-MCNC: 50 MG/DL (ref 40–60)
HGB BLD-MCNC: 13.4 G/DL (ref 12–15.9)
HGB UR QL STRIP: NEGATIVE
IMM GRANULOCYTES # BLD AUTO: 0.01 10*3/MM3 (ref 0–0.05)
IMM GRANULOCYTES NFR BLD AUTO: 0.2 % (ref 0–0.5)
KETONES UR QL STRIP: NEGATIVE
LDLC SERPL CALC-MCNC: 54 MG/DL (ref 0–100)
LEUKOCYTE ESTERASE UR QL STRIP: NEGATIVE
LYMPHOCYTES # BLD AUTO: 2.11 10*3/MM3 (ref 0.7–3.1)
LYMPHOCYTES NFR BLD AUTO: 39.3 % (ref 19.6–45.3)
MCH RBC QN AUTO: 27.6 PG (ref 26.6–33)
MCHC RBC AUTO-ENTMCNC: 32.4 G/DL (ref 31.5–35.7)
MCV RBC AUTO: 85 FL (ref 79–97)
MONOCYTES # BLD AUTO: 0.41 10*3/MM3 (ref 0.1–0.9)
MONOCYTES NFR BLD AUTO: 7.6 % (ref 5–12)
NEUTROPHILS # BLD AUTO: 2.6 10*3/MM3 (ref 1.7–7)
NEUTROPHILS NFR BLD AUTO: 48.4 % (ref 42.7–76)
NITRITE UR QL STRIP: NEGATIVE
NRBC BLD AUTO-RTO: 0 /100 WBC (ref 0–0.2)
PH UR STRIP: 7 [PH] (ref 5–8)
PLATELET # BLD AUTO: 321 10*3/MM3 (ref 140–450)
POTASSIUM SERPL-SCNC: 4.9 MMOL/L (ref 3.5–5.2)
PROT SERPL-MCNC: 7 G/DL (ref 6–8.5)
PROT UR QL STRIP: NEGATIVE
RBC # BLD AUTO: 4.86 10*6/MM3 (ref 3.77–5.28)
RBC #/AREA URNS HPF: NORMAL /HPF
SODIUM SERPL-SCNC: 135 MMOL/L (ref 136–145)
SP GR UR STRIP: 1.02 (ref 1–1.03)
TRIGL SERPL-MCNC: 85 MG/DL (ref 0–150)
TSH SERPL DL<=0.005 MIU/L-ACNC: 1.35 UIU/ML (ref 0.27–4.2)
UROBILINOGEN UR STRIP-MCNC: NORMAL MG/DL
VLDLC SERPL CALC-MCNC: 17 MG/DL (ref 5–40)
WBC # BLD AUTO: 5.37 10*3/MM3 (ref 3.4–10.8)
WBC #/AREA URNS HPF: NORMAL /HPF

## 2024-07-18 ENCOUNTER — TELEPHONE (OUTPATIENT)
Dept: INTERNAL MEDICINE | Facility: CLINIC | Age: 74
End: 2024-07-18

## 2024-07-18 NOTE — TELEPHONE ENCOUNTER
Caller: Serenity Hung    Relationship: Self    Best call back number: [phone]     What orders are you requesting (i.e. lab or imaging): MAMMOGRAM ALREADY SCHEDULE FOR 7-22-24      In what timeframe would the patient need to come in: ASAP    Where will you receive your lab/imaging services: [facility]     Additional notes:

## 2024-07-23 DIAGNOSIS — Z12.31 SCREENING MAMMOGRAM, ENCOUNTER FOR: ICD-10-CM

## 2024-09-04 DIAGNOSIS — E78.49 OTHER HYPERLIPIDEMIA: ICD-10-CM

## 2024-09-05 RX ORDER — ATORVASTATIN CALCIUM 20 MG/1
20 TABLET, FILM COATED ORAL DAILY
Qty: 90 TABLET | Refills: 3 | Status: SHIPPED | OUTPATIENT
Start: 2024-09-05

## 2024-10-16 ENCOUNTER — OFFICE VISIT (OUTPATIENT)
Dept: INTERNAL MEDICINE | Facility: CLINIC | Age: 74
End: 2024-10-16
Payer: MEDICARE

## 2024-10-16 VITALS
WEIGHT: 218 LBS | SYSTOLIC BLOOD PRESSURE: 142 MMHG | HEART RATE: 74 BPM | TEMPERATURE: 97.6 F | DIASTOLIC BLOOD PRESSURE: 80 MMHG | BODY MASS INDEX: 38.62 KG/M2 | OXYGEN SATURATION: 97 % | HEIGHT: 63 IN

## 2024-10-16 DIAGNOSIS — E66.01 CLASS 2 SEVERE OBESITY DUE TO EXCESS CALORIES WITH SERIOUS COMORBIDITY AND BODY MASS INDEX (BMI) OF 38.0 TO 38.9 IN ADULT: ICD-10-CM

## 2024-10-16 DIAGNOSIS — I10 ESSENTIAL HYPERTENSION: Primary | ICD-10-CM

## 2024-10-16 DIAGNOSIS — E66.812 CLASS 2 SEVERE OBESITY DUE TO EXCESS CALORIES WITH SERIOUS COMORBIDITY AND BODY MASS INDEX (BMI) OF 38.0 TO 38.9 IN ADULT: ICD-10-CM

## 2024-10-16 DIAGNOSIS — Z23 NEED FOR INFLUENZA VACCINATION: ICD-10-CM

## 2024-10-16 PROCEDURE — 90662 IIV NO PRSV INCREASED AG IM: CPT

## 2024-10-16 PROCEDURE — 3077F SYST BP >= 140 MM HG: CPT

## 2024-10-16 PROCEDURE — 99213 OFFICE O/P EST LOW 20 MIN: CPT

## 2024-10-16 PROCEDURE — 1126F AMNT PAIN NOTED NONE PRSNT: CPT

## 2024-10-16 PROCEDURE — G0008 ADMIN INFLUENZA VIRUS VAC: HCPCS

## 2024-10-16 PROCEDURE — 1160F RVW MEDS BY RX/DR IN RCRD: CPT

## 2024-10-16 PROCEDURE — 3079F DIAST BP 80-89 MM HG: CPT

## 2024-10-16 PROCEDURE — 1159F MED LIST DOCD IN RCRD: CPT

## 2024-10-16 NOTE — PROGRESS NOTES
"        Subjective     Chief Complaint:  Follow-up hypertension    HPI:  Patient presents today for follow-up on hypertension. Please see assessment and plan below.    Past Medical History:   Past Medical History:   Diagnosis Date    Hyperlipidemia     Hypertension      Past Surgical History:History reviewed. No pertinent surgical history.    Allergies:  No Known Allergies  Medications:  Prior to Admission medications    Medication Sig Start Date End Date Taking? Authorizing Provider   amLODIPine (NORVASC) 5 MG tablet Take 0.5 tablets by mouth Daily. 2/5/24  Yes Jennifer Masters APRN   atorvastatin (LIPITOR) 20 MG tablet TAKE 1 TABLET BY MOUTH ONCE  DAILY 9/5/24  Yes Jennifer Masters APRN   hydroCHLOROthiazide (HYDRODIURIL) 25 MG tablet Take 1 tablet by mouth Daily. 2/5/24  Yes Jennifer Masters APRN   lisinopril (PRINIVIL,ZESTRIL) 40 MG tablet TAKE 1 TABLET BY MOUTH DAILY 2/5/24  Yes Jennifer Masters APRN       Objective     Vital Signs: /80 (BP Location: Left arm, Patient Position: Sitting, Cuff Size: Adult)   Pulse 74   Temp 97.6 °F (36.4 °C) (Temporal)   Ht 160 cm (62.99\")   Wt 98.9 kg (218 lb)   LMP  (LMP Unknown)   SpO2 97%   BMI 38.63 kg/m²   Physical Exam  Vitals and nursing note reviewed.   Constitutional:       General: She is not in acute distress.     Appearance: Normal appearance.   Cardiovascular:      Rate and Rhythm: Normal rate and regular rhythm.      Pulses: Normal pulses.      Heart sounds: Normal heart sounds. No murmur heard.  Pulmonary:      Effort: Pulmonary effort is normal. No respiratory distress.      Breath sounds: Normal breath sounds. No wheezing.   Skin:     Findings: No bruising, erythema or rash.   Neurological:      Mental Status: She is alert and oriented to person, place, and time. Mental status is at baseline.      Motor: No weakness.       Results Reviewed:  No new results available to review.    Assessment / Plan     Assessment/Plan:  Diagnoses and all orders " for this visit:    1. Essential hypertension (Primary)    2. Class 2 severe obesity due to excess calories with serious comorbidity and body mass index (BMI) of 38.0 to 38.9 in adult    3. Need for influenza vaccination  -     Fluzone High-Dose 65+yrs       Patient presents today for follow-up on hypertension.  She continues taking amlodipine 2.5 mg daily, hydrochlorothiazide 25 mg daily, and lisinopril 40 mg daily.  Blood pressure in clinic today is 142/80.  She does monitor her blood pressure at home.  Systolic typically runs in the 130s.  Continue current regimen.    She has gained some weight back but she is caring for her mother who is on hospice.  She is unable to focus on her diet.  She knows how to lose weight but is not able to put forth effort during the season of life.    She denies any other complaints today.  She will return in 6 months for Medicare wellness visit.  Encouraged her to reach out if she develops any concerns before then.    Return in about 6 months (around 4/16/2025) for Medicare Wellness. unless patient needs to be seen sooner or acute issues arise.    I have discussed the patient results/orders and and plan/recommendation with them at today's visit.      Jennifer Masters, APRN   10/16/2024

## 2024-12-13 DIAGNOSIS — I10 ESSENTIAL HYPERTENSION: ICD-10-CM

## 2024-12-16 RX ORDER — HYDROCHLOROTHIAZIDE 25 MG/1
25 TABLET ORAL DAILY
Qty: 90 TABLET | Refills: 3 | Status: SHIPPED | OUTPATIENT
Start: 2024-12-16

## 2024-12-16 RX ORDER — AMLODIPINE BESYLATE 5 MG/1
2.5 TABLET ORAL DAILY
Qty: 45 TABLET | Refills: 3 | Status: SHIPPED | OUTPATIENT
Start: 2024-12-16

## 2024-12-16 RX ORDER — LISINOPRIL 40 MG/1
40 TABLET ORAL DAILY
Qty: 90 TABLET | Refills: 3 | Status: SHIPPED | OUTPATIENT
Start: 2024-12-16

## 2025-04-18 ENCOUNTER — TELEPHONE (OUTPATIENT)
Dept: INTERNAL MEDICINE | Facility: CLINIC | Age: 75
End: 2025-04-18
Payer: MEDICARE

## 2025-04-28 ENCOUNTER — OFFICE VISIT (OUTPATIENT)
Dept: INTERNAL MEDICINE | Facility: CLINIC | Age: 75
End: 2025-04-28
Payer: MEDICARE

## 2025-04-28 VITALS
WEIGHT: 214 LBS | DIASTOLIC BLOOD PRESSURE: 80 MMHG | HEART RATE: 68 BPM | RESPIRATION RATE: 18 BRPM | HEIGHT: 63 IN | SYSTOLIC BLOOD PRESSURE: 140 MMHG | TEMPERATURE: 97.7 F | OXYGEN SATURATION: 98 % | BODY MASS INDEX: 37.92 KG/M2

## 2025-04-28 DIAGNOSIS — Z00.00 MEDICARE ANNUAL WELLNESS VISIT, SUBSEQUENT: Primary | ICD-10-CM

## 2025-04-28 RX ORDER — CHOLECALCIFEROL (VITAMIN D3) 25 MCG
1 CAPSULE ORAL DAILY
COMMUNITY

## 2025-04-28 RX ORDER — OMEGA-3S/DHA/EPA/FISH OIL/D3 300MG-1000
400 CAPSULE ORAL DAILY
COMMUNITY

## 2025-04-28 NOTE — PROGRESS NOTES
Subjective   The ABCs of the Annual Wellness Visit  Medicare Wellness Visit      Serenity Hung is a 74 y.o. patient who presents for a Medicare Wellness Visit.    The following portions of the patient's history were reviewed and   updated as appropriate: allergies, current medications, past family history, past medical history, past social history, past surgical history, and problem list.    Compared to one year ago, the patient's physical   health is the same.  Compared to one year ago, the patient's mental   health is the same.    Recent Hospitalizations:  She was not admitted to the hospital during the last year.     Current Medical Providers:  Patient Care Team:  Kamille Novak APRN as PCP - General (Nurse Practitioner)    Outpatient Medications Prior to Visit   Medication Sig Dispense Refill    amLODIPine (NORVASC) 5 MG tablet Take 0.5 tablets by mouth Daily. 45 tablet 3    atorvastatin (LIPITOR) 20 MG tablet TAKE 1 TABLET BY MOUTH ONCE  DAILY 90 tablet 3    Cholecalciferol (Vitamin D-3) 25 MCG (1000 UT) capsule Take 1,000 Units by mouth Daily.      Cholecalciferol 10 MCG (400 UNIT) tablet Take 1 tablet by mouth Daily.      hydroCHLOROthiazide 25 MG tablet Take 1 tablet by mouth Daily. 90 tablet 3    lisinopril (PRINIVIL,ZESTRIL) 40 MG tablet TAKE 1 TABLET BY MOUTH DAILY 90 tablet 3     No facility-administered medications prior to visit.     No opioid medication identified on active medication list. I have reviewed chart for other potential  high risk medication/s and harmful drug interactions in the elderly.      Aspirin is not on active medication list.  Aspirin use is not indicated based on review of current medical condition/s. Risk of harm outweighs potential benefits.  .    Patient Active Problem List   Diagnosis    Essential hypertension    Morbidly obese    At risk for falls    Mixed hyperlipidemia    Osteoporosis, postmenopausal     Advance Care Planning Advance Directive is not on file.  ACP  "discussion was declined by the patient. Patient does not have an advance directive, information provided. Patient does not have an advance directive, declines further assistance.            Objective   Vitals:    25 0903   BP: 140/80   BP Location: Left arm   Patient Position: Sitting   Cuff Size: Large Adult   Pulse: 68   Resp: 18   Temp: 97.7 °F (36.5 °C)   TempSrc: Infrared   SpO2: 98%   Weight: 97.1 kg (214 lb)   Height: 160 cm (63\")   PainSc: 0-No pain       Estimated body mass index is 37.91 kg/m² as calculated from the following:    Height as of this encounter: 160 cm (63\").    Weight as of this encounter: 97.1 kg (214 lb).    Class 2 Severe Obesity (BMI >=35 and <=39.9). Obesity-related health conditions include the following: hypertension and dyslipidemias. Obesity is improving with lifestyle modifications. BMI is is above average; BMI management plan is completed. We discussed portion control and increasing exercise.           Does the patient have evidence of cognitive impairment? No  Lab Results   Component Value Date    CHLPL 113 2025    TRIG 80 2025    HDL 49 2025    LDL 48 2025    VLDL 16 2025                                                                                               Health  Risk Assessment    Smoking Status:  Social History     Tobacco Use   Smoking Status Never   Smokeless Tobacco Never     Alcohol Consumption:  Social History     Substance and Sexual Activity   Alcohol Use Never       Fall Risk Screen  STEADI Fall Risk Assessment was completed, and patient is at LOW risk for falls.Assessment completed on:2025    Depression Screening   Little interest or pleasure in doing things? Not at all   Feeling down, depressed, or hopeless? Not at all   PHQ-2 Total Score 0      Health Habits and Functional and Cognitive Screenin/21/2025     7:12 AM   Functional & Cognitive Status   Do you have difficulty preparing food and eating? No   Do you " have difficulty bathing yourself, getting dressed or grooming yourself? No   Do you have difficulty using the toilet? No   Do you have difficulty moving around from place to place? No   Do you have trouble with steps or getting out of a bed or a chair? No   Current Diet Well Balanced Diet   Dental Exam Up to date   Eye Exam Up to date   Exercise (times per week) 3 times per week   Current Exercises Include Gardening;House Cleaning;Walking;Yard Work   Do you need help using the phone?  No   Are you deaf or do you have serious difficulty hearing?  No   Do you need help to go to places out of walking distance? No   Do you need help shopping? No   Do you need help preparing meals?  No   Do you need help with housework?  No   Do you need help with laundry? No   Do you need help taking your medications? No   Do you need help managing money? No   Do you ever drive or ride in a car without wearing a seat belt? No   Have you felt unusual stress, anger or loneliness in the last month? No   Who do you live with? Other   If you need help, do you have trouble finding someone available to you? No   Have you been bothered in the last four weeks by sexual problems? No   Do you have difficulty concentrating, remembering or making decisions? No           Age-appropriate Screening Schedule:  Refer to the list below for future screening recommendations based on patient's age, sex and/or medical conditions. Orders for these recommended tests are listed in the plan section. The patient has been provided with a written plan.    Health Maintenance List  Health Maintenance   Topic Date Due    RSV Vaccine - Adults (1 - 1-dose 75+ series) Never done    COVID-19 Vaccine (5 - 2024-25 season) 09/01/2024    ANNUAL WELLNESS VISIT  04/16/2025    DXA SCAN  07/21/2025    INFLUENZA VACCINE  07/01/2025    LIPID PANEL  04/17/2026    TDAP/TD VACCINES (2 - Td or Tdap) 06/07/2031    Pneumococcal Vaccine 50+  Completed    ZOSTER VACCINE  Completed     HEPATITIS C SCREENING  Discontinued    MAMMOGRAM  Discontinued    COLORECTAL CANCER SCREENING  Discontinued                                                                                                                                                CMS Preventative Services Quick Reference  Risk Factors Identified During Encounter  Fall Risk-High or Moderate: Discussed Fall Prevention in the home  Dental Screening Recommended  Vision Screening Recommended    The above risks/problems have been discussed with the patient.  Pertinent information has been shared with the patient in the After Visit Summary.  An After Visit Summary and PPPS were made available to the patient.    Follow Up:   Next Medicare Wellness visit to be scheduled in 1 year.     Assessment & Plan  Medicare annual wellness visit, subsequent            Assessment & Plan  Medicare wellness checkup.  Vital signs are within normal limits today. Presence of fluid in the ears, likely due to seasonal allergies. Recent lab results show slightly elevated blood glucose levels and marginally decreased sodium levels, attributed to hydrochlorothiazide. Lipid profile and vitamin D levels are within normal ranges. Follow-up appointment scheduled for 10/2025 or earlier if necessary.    Immunizations:      - Tetanus: up to date      - Influenza: received 10/16/2024      - Prevnar: completed      - Shingrix: completed      - COVID: received 3/23/21, 11/1/21, 6/5/23  DEXA: DEXA scan due in July of this year       Follow Up:   Return for Next scheduled follow up.

## 2025-07-11 DIAGNOSIS — Z12.31 SCREENING MAMMOGRAM, ENCOUNTER FOR: Primary | ICD-10-CM

## 2025-07-16 DIAGNOSIS — E78.49 OTHER HYPERLIPIDEMIA: ICD-10-CM

## 2025-07-16 RX ORDER — ATORVASTATIN CALCIUM 20 MG/1
20 TABLET, FILM COATED ORAL DAILY
Qty: 90 TABLET | Refills: 3 | Status: SHIPPED | OUTPATIENT
Start: 2025-07-16

## 2025-07-28 DIAGNOSIS — Z12.31 SCREENING MAMMOGRAM, ENCOUNTER FOR: ICD-10-CM
